# Patient Record
Sex: FEMALE | Race: WHITE | HISPANIC OR LATINO | Employment: OTHER | URBAN - METROPOLITAN AREA
[De-identification: names, ages, dates, MRNs, and addresses within clinical notes are randomized per-mention and may not be internally consistent; named-entity substitution may affect disease eponyms.]

---

## 2019-03-19 DIAGNOSIS — I10 ESSENTIAL HYPERTENSION: ICD-10-CM

## 2019-03-19 DIAGNOSIS — E78.2 MIXED HYPERLIPIDEMIA: ICD-10-CM

## 2019-03-19 LAB
ALBUMIN SERPL-MCNC: 3.9 G/DL (ref 3.5–4.6)
ALP BLD-CCNC: 105 U/L (ref 40–130)
ALT SERPL-CCNC: 27 U/L (ref 0–33)
ANION GAP SERPL CALCULATED.3IONS-SCNC: 16 MEQ/L (ref 9–15)
AST SERPL-CCNC: 27 U/L (ref 0–35)
BASOPHILS ABSOLUTE: 0 K/UL (ref 0–0.2)
BASOPHILS RELATIVE PERCENT: 0.7 %
BILIRUB SERPL-MCNC: 0.4 MG/DL (ref 0.2–0.7)
BUN BLDV-MCNC: 14 MG/DL (ref 8–23)
CALCIUM SERPL-MCNC: 9.4 MG/DL (ref 8.5–9.9)
CHLORIDE BLD-SCNC: 102 MEQ/L (ref 95–107)
CHOLESTEROL, TOTAL: 177 MG/DL (ref 0–199)
CO2: 27 MEQ/L (ref 20–31)
CREAT SERPL-MCNC: 0.69 MG/DL (ref 0.5–0.9)
EOSINOPHILS ABSOLUTE: 0.1 K/UL (ref 0–0.7)
EOSINOPHILS RELATIVE PERCENT: 1.2 %
GFR AFRICAN AMERICAN: >60
GFR NON-AFRICAN AMERICAN: >60
GLOBULIN: 3.4 G/DL (ref 2.3–3.5)
GLUCOSE BLD-MCNC: 99 MG/DL (ref 70–99)
HCT VFR BLD CALC: 42.3 % (ref 37–47)
HDLC SERPL-MCNC: 54 MG/DL (ref 40–59)
HEMOGLOBIN: 14.2 G/DL (ref 12–16)
LDL CHOLESTEROL CALCULATED: 98 MG/DL (ref 0–129)
LYMPHOCYTES ABSOLUTE: 1.6 K/UL (ref 1–4.8)
LYMPHOCYTES RELATIVE PERCENT: 26.2 %
MCH RBC QN AUTO: 30.5 PG (ref 27–31.3)
MCHC RBC AUTO-ENTMCNC: 33.5 % (ref 33–37)
MCV RBC AUTO: 91.1 FL (ref 82–100)
MONOCYTES ABSOLUTE: 0.6 K/UL (ref 0.2–0.8)
MONOCYTES RELATIVE PERCENT: 9.1 %
NEUTROPHILS ABSOLUTE: 3.9 K/UL (ref 1.4–6.5)
NEUTROPHILS RELATIVE PERCENT: 62.8 %
PDW BLD-RTO: 13.5 % (ref 11.5–14.5)
PLATELET # BLD: 228 K/UL (ref 130–400)
POTASSIUM SERPL-SCNC: 3.9 MEQ/L (ref 3.4–4.9)
RBC # BLD: 4.65 M/UL (ref 4.2–5.4)
SODIUM BLD-SCNC: 145 MEQ/L (ref 135–144)
TOTAL PROTEIN: 7.3 G/DL (ref 6.3–8)
TRIGL SERPL-MCNC: 126 MG/DL (ref 0–150)
WBC # BLD: 6.2 K/UL (ref 4.8–10.8)

## 2023-02-15 PROBLEM — F33.1 MODERATE EPISODE OF RECURRENT MAJOR DEPRESSIVE DISORDER (MULTI): Status: ACTIVE | Noted: 2023-02-15

## 2023-02-15 PROBLEM — N32.9 BLADDER DISORDER: Status: ACTIVE | Noted: 2023-02-15

## 2023-02-15 PROBLEM — E87.6 HYPOKALEMIA: Status: ACTIVE | Noted: 2023-02-15

## 2023-02-15 PROBLEM — B00.1 COLD SORE: Status: ACTIVE | Noted: 2023-02-15

## 2023-02-15 PROBLEM — M79.671 BILATERAL PAIN OF LEG AND FOOT: Status: ACTIVE | Noted: 2023-02-15

## 2023-02-15 PROBLEM — I10 ESSENTIAL HYPERTENSION: Status: ACTIVE | Noted: 2023-02-15

## 2023-02-15 PROBLEM — J01.00 ACUTE NON-RECURRENT MAXILLARY SINUSITIS: Status: RESOLVED | Noted: 2023-02-15 | Resolved: 2023-02-15

## 2023-02-15 PROBLEM — K21.9 GASTRO-ESOPHAGEAL REFLUX DISEASE WITHOUT ESOPHAGITIS: Status: ACTIVE | Noted: 2023-02-15

## 2023-02-15 PROBLEM — E78.2 MIXED HYPERLIPIDEMIA: Status: ACTIVE | Noted: 2023-02-15

## 2023-02-15 PROBLEM — M79.604 BILATERAL PAIN OF LEG AND FOOT: Status: ACTIVE | Noted: 2023-02-15

## 2023-02-15 PROBLEM — F32.A ANXIETY AND DEPRESSION: Status: ACTIVE | Noted: 2023-02-15

## 2023-02-15 PROBLEM — T14.8XXA MUSCLE STRAIN: Status: ACTIVE | Noted: 2023-02-15

## 2023-02-15 PROBLEM — J31.2 CHRONIC PHARYNGITIS: Status: ACTIVE | Noted: 2023-02-15

## 2023-02-15 PROBLEM — R10.30 INGUINAL PAIN: Status: ACTIVE | Noted: 2023-02-15

## 2023-02-15 PROBLEM — M25.552 HIP PAIN, LEFT: Status: ACTIVE | Noted: 2023-02-15

## 2023-02-15 PROBLEM — J32.9 SINUSITIS: Status: RESOLVED | Noted: 2023-02-15 | Resolved: 2023-02-15

## 2023-02-15 PROBLEM — F41.1 GENERALIZED ANXIETY DISORDER: Status: ACTIVE | Noted: 2023-02-15

## 2023-02-15 PROBLEM — R53.82 CHRONIC FATIGUE: Status: ACTIVE | Noted: 2023-02-15

## 2023-02-15 PROBLEM — H69.91 DYSFUNCTION OF RIGHT EUSTACHIAN TUBE: Status: ACTIVE | Noted: 2023-02-15

## 2023-02-15 PROBLEM — R06.09 DYSPNEA ON EXERTION: Status: ACTIVE | Noted: 2023-02-15

## 2023-02-15 PROBLEM — E55.9 VITAMIN D DEFICIENCY: Status: ACTIVE | Noted: 2023-02-15

## 2023-02-15 PROBLEM — M79.605 BILATERAL PAIN OF LEG AND FOOT: Status: ACTIVE | Noted: 2023-02-15

## 2023-02-15 PROBLEM — R60.0 BILATERAL LEG EDEMA: Status: ACTIVE | Noted: 2023-02-15

## 2023-02-15 PROBLEM — R19.04 LEFT LOWER QUADRANT ABDOMINAL MASS: Status: ACTIVE | Noted: 2023-02-15

## 2023-02-15 PROBLEM — H92.03 OTALGIA OF BOTH EARS: Status: RESOLVED | Noted: 2023-02-15 | Resolved: 2023-02-15

## 2023-02-15 PROBLEM — R10.30 LOWER ABDOMINAL PAIN: Status: ACTIVE | Noted: 2023-02-15

## 2023-02-15 PROBLEM — F41.9 ANXIETY AND DEPRESSION: Status: ACTIVE | Noted: 2023-02-15

## 2023-02-15 PROBLEM — L85.3 XEROSIS CUTIS: Status: ACTIVE | Noted: 2023-02-15

## 2023-02-15 PROBLEM — M79.672 BILATERAL PAIN OF LEG AND FOOT: Status: ACTIVE | Noted: 2023-02-15

## 2023-02-15 RX ORDER — METOPROLOL SUCCINATE 50 MG/1
1 TABLET, EXTENDED RELEASE ORAL DAILY
COMMUNITY
Start: 2020-09-01 | End: 2023-04-03

## 2023-02-15 RX ORDER — PAROXETINE HYDROCHLORIDE 40 MG/1
1 TABLET, FILM COATED ORAL DAILY
COMMUNITY

## 2023-02-15 RX ORDER — FUROSEMIDE 20 MG/1
20 TABLET ORAL DAILY
COMMUNITY
Start: 2020-08-09

## 2023-02-15 RX ORDER — NITROGLYCERIN 0.4 MG/1
0.4 TABLET SUBLINGUAL EVERY 5 MIN PRN
COMMUNITY
Start: 2018-09-30

## 2023-02-15 RX ORDER — POTASSIUM CHLORIDE 750 MG/1
1 TABLET, FILM COATED, EXTENDED RELEASE ORAL 2 TIMES DAILY
COMMUNITY
Start: 2019-11-21 | End: 2023-08-21

## 2023-02-15 RX ORDER — VALACYCLOVIR HYDROCHLORIDE 1 G/1
TABLET, FILM COATED ORAL
COMMUNITY
Start: 2018-11-20 | End: 2023-06-21

## 2023-02-15 RX ORDER — LISINOPRIL AND HYDROCHLOROTHIAZIDE 20; 25 MG/1; MG/1
1 TABLET ORAL DAILY
COMMUNITY
Start: 2019-11-21 | End: 2023-04-03

## 2023-02-15 RX ORDER — SIMVASTATIN 10 MG/1
1 TABLET, FILM COATED ORAL DAILY
COMMUNITY
Start: 2019-11-21 | End: 2023-04-03

## 2023-03-03 LAB
C. DIFFICILE TOXIN, PCR: NORMAL
CLOSTRIDIUM DIFFICILE NAP 1 STRAIN (PRESUMPTIVE): NORMAL

## 2023-03-04 LAB
CAMPYLOBACTER GP: NOT DETECTED
NOROVIRUS GI/GII: NOT DETECTED
ROTAVIRUS A: NOT DETECTED
SALMONELLA SP.: NOT DETECTED
SHIGA TOXIN 1: NOT DETECTED
SHIGA TOXIN 2: NOT DETECTED
SHIGELLA SP.: NOT DETECTED
VIBRIO GRP.: NOT DETECTED
YERSINIA ENTEROCOLITICA: NOT DETECTED

## 2023-03-06 ENCOUNTER — TELEPHONE (OUTPATIENT)
Dept: PRIMARY CARE | Facility: CLINIC | Age: 68
End: 2023-03-06
Payer: MEDICARE

## 2023-03-06 NOTE — TELEPHONE ENCOUNTER
"Result Communication    Resulted Orders   Stool Pathogen Panel, PCR   Result Value Ref Range    Yersinia Enterocolitica NOT DETECTED NOT DETECTED    Campylobacter gp. NOT DETECTED NOT DETECTED    Salmonella sp. NOT DETECTED NOT DETECTED    Shigella sp. NOT DETECTED NOT DETECTED    Vibrio grp. NOT DETECTED NOT DETECTED    Shiga Toxin 1 NOT DETECTED NOT DETECTED    Shiga Toxin 2 NOT DETECTED NOT DETECTED    Norovirus GI/GII NOT DETECTED NOT DETECTED    Rotavirus A NOT DETECTED NOT DETECTED      Comment:       The enteric PCR panel is a panel of sensitive and specific   amplified nucleic acid tests indicated as an aid in the   diagnosis of specific bacterial and viral agents of   gastrointestinal illness, in conjunction with other   clinical, laboratory, and epidemiological information.   This test is not approved for monitoring these infections.   Monitoring is available for Salmonella and Shigella   infections-request test \"Stool PCR Follow-Up ([facility])\".   Monitoring tests are not available at this time for other   enteric agents in this panel.         8:51 AM      Results were successfully communicated with the patient and they acknowledged their understanding.    "

## 2023-03-06 NOTE — TELEPHONE ENCOUNTER
----- Message from Michael Gonzalez MD sent at 3/5/2023  9:58 AM EST -----  Please call Ragini Hernandez that @[facility] stool culture result is negative.

## 2023-03-08 LAB — CALPROTECTIN, STOOL: 48 UG/G

## 2023-03-10 LAB
CRYPTOSPORIDIUM ANTIGEN-DATA CONVERSION: NEGATIVE
GIARDIA LAMBLIA AG-DATA CONVERSION: NEGATIVE
OVA + PARASITE EXAM: NEGATIVE

## 2023-04-01 DIAGNOSIS — I10 ESSENTIAL HYPERTENSION: ICD-10-CM

## 2023-04-01 DIAGNOSIS — E78.2 MIXED HYPERLIPIDEMIA: ICD-10-CM

## 2023-04-03 RX ORDER — METOPROLOL SUCCINATE 50 MG/1
TABLET, EXTENDED RELEASE ORAL
Qty: 30 TABLET | Refills: 5 | Status: SHIPPED | OUTPATIENT
Start: 2023-04-03 | End: 2023-10-18 | Stop reason: DRUGHIGH

## 2023-04-03 RX ORDER — LISINOPRIL AND HYDROCHLOROTHIAZIDE 20; 25 MG/1; MG/1
TABLET ORAL
Qty: 30 TABLET | Refills: 5 | Status: SHIPPED | OUTPATIENT
Start: 2023-04-03 | End: 2023-10-18 | Stop reason: DRUGHIGH

## 2023-04-03 RX ORDER — SIMVASTATIN 10 MG/1
TABLET, FILM COATED ORAL
Qty: 30 TABLET | Refills: 5 | Status: SHIPPED | OUTPATIENT
Start: 2023-04-03 | End: 2023-04-06 | Stop reason: DRUGHIGH

## 2023-04-05 ENCOUNTER — LAB (OUTPATIENT)
Dept: LAB | Facility: LAB | Age: 68
End: 2023-04-05
Payer: MEDICARE

## 2023-04-05 ENCOUNTER — OFFICE VISIT (OUTPATIENT)
Dept: PRIMARY CARE | Facility: CLINIC | Age: 68
End: 2023-04-05
Payer: MEDICARE

## 2023-04-05 VITALS
WEIGHT: 187.6 LBS | HEIGHT: 60 IN | BODY MASS INDEX: 36.83 KG/M2 | DIASTOLIC BLOOD PRESSURE: 80 MMHG | OXYGEN SATURATION: 98 % | SYSTOLIC BLOOD PRESSURE: 112 MMHG | HEART RATE: 67 BPM

## 2023-04-05 DIAGNOSIS — E66.01 CLASS 2 SEVERE OBESITY DUE TO EXCESS CALORIES WITH SERIOUS COMORBIDITY AND BODY MASS INDEX (BMI) OF 36.0 TO 36.9 IN ADULT (MULTI): ICD-10-CM

## 2023-04-05 DIAGNOSIS — R19.7 DIARRHEA, UNSPECIFIED TYPE: ICD-10-CM

## 2023-04-05 DIAGNOSIS — Z00.00 ROUTINE GENERAL MEDICAL EXAMINATION AT HEALTH CARE FACILITY: Primary | ICD-10-CM

## 2023-04-05 DIAGNOSIS — F33.1 MODERATE EPISODE OF RECURRENT MAJOR DEPRESSIVE DISORDER (MULTI): ICD-10-CM

## 2023-04-05 DIAGNOSIS — R10.13 EPIGASTRIC PAIN: ICD-10-CM

## 2023-04-05 DIAGNOSIS — E78.2 MIXED HYPERLIPIDEMIA: ICD-10-CM

## 2023-04-05 DIAGNOSIS — I10 ESSENTIAL HYPERTENSION: ICD-10-CM

## 2023-04-05 DIAGNOSIS — K57.90 DIVERTICULOSIS: ICD-10-CM

## 2023-04-05 LAB
ALANINE AMINOTRANSFERASE (SGPT) (U/L) IN SER/PLAS: 18 U/L (ref 7–45)
ALBUMIN (G/DL) IN SER/PLAS: 4.3 G/DL (ref 3.4–5)
ALKALINE PHOSPHATASE (U/L) IN SER/PLAS: 95 U/L (ref 33–136)
ANION GAP IN SER/PLAS: 12 MMOL/L (ref 10–20)
ASPARTATE AMINOTRANSFERASE (SGOT) (U/L) IN SER/PLAS: 20 U/L (ref 9–39)
BASOPHILS (10*3/UL) IN BLOOD BY AUTOMATED COUNT: 0.03 X10E9/L (ref 0–0.1)
BASOPHILS/100 LEUKOCYTES IN BLOOD BY AUTOMATED COUNT: 0.4 % (ref 0–2)
BILIRUBIN TOTAL (MG/DL) IN SER/PLAS: 1 MG/DL (ref 0–1.2)
CALCIUM (MG/DL) IN SER/PLAS: 9.7 MG/DL (ref 8.6–10.3)
CARBON DIOXIDE, TOTAL (MMOL/L) IN SER/PLAS: 34 MMOL/L (ref 21–32)
CHLORIDE (MMOL/L) IN SER/PLAS: 102 MMOL/L (ref 98–107)
CHOLESTEROL (MG/DL) IN SER/PLAS: 214 MG/DL (ref 0–199)
CHOLESTEROL IN HDL (MG/DL) IN SER/PLAS: 72.9 MG/DL
CHOLESTEROL/HDL RATIO: 2.9
CREATININE (MG/DL) IN SER/PLAS: 1.01 MG/DL (ref 0.5–1.05)
EOSINOPHILS (10*3/UL) IN BLOOD BY AUTOMATED COUNT: 0.06 X10E9/L (ref 0–0.7)
EOSINOPHILS/100 LEUKOCYTES IN BLOOD BY AUTOMATED COUNT: 0.8 % (ref 0–6)
ERYTHROCYTE DISTRIBUTION WIDTH (RATIO) BY AUTOMATED COUNT: 12.8 % (ref 11.5–14.5)
ERYTHROCYTE MEAN CORPUSCULAR HEMOGLOBIN CONCENTRATION (G/DL) BY AUTOMATED: 32.8 G/DL (ref 32–36)
ERYTHROCYTE MEAN CORPUSCULAR VOLUME (FL) BY AUTOMATED COUNT: 94 FL (ref 80–100)
ERYTHROCYTES (10*6/UL) IN BLOOD BY AUTOMATED COUNT: 5 X10E12/L (ref 4–5.2)
GFR FEMALE: 60 ML/MIN/1.73M2
GLUCOSE (MG/DL) IN SER/PLAS: 116 MG/DL (ref 74–99)
HEMATOCRIT (%) IN BLOOD BY AUTOMATED COUNT: 46.9 % (ref 36–46)
HEMOGLOBIN (G/DL) IN BLOOD: 15.4 G/DL (ref 12–16)
IMMATURE GRANULOCYTES/100 LEUKOCYTES IN BLOOD BY AUTOMATED COUNT: 0.3 % (ref 0–0.9)
LDL: 117 MG/DL (ref 0–99)
LEUKOCYTES (10*3/UL) IN BLOOD BY AUTOMATED COUNT: 7.3 X10E9/L (ref 4.4–11.3)
LYMPHOCYTES (10*3/UL) IN BLOOD BY AUTOMATED COUNT: 1.54 X10E9/L (ref 1.2–4.8)
LYMPHOCYTES/100 LEUKOCYTES IN BLOOD BY AUTOMATED COUNT: 21.2 % (ref 13–44)
MONOCYTES (10*3/UL) IN BLOOD BY AUTOMATED COUNT: 0.65 X10E9/L (ref 0.1–1)
MONOCYTES/100 LEUKOCYTES IN BLOOD BY AUTOMATED COUNT: 9 % (ref 2–10)
NEUTROPHILS (10*3/UL) IN BLOOD BY AUTOMATED COUNT: 4.96 X10E9/L (ref 1.2–7.7)
NEUTROPHILS/100 LEUKOCYTES IN BLOOD BY AUTOMATED COUNT: 68.3 % (ref 40–80)
PLATELETS (10*3/UL) IN BLOOD AUTOMATED COUNT: 268 X10E9/L (ref 150–450)
POTASSIUM (MMOL/L) IN SER/PLAS: 4.2 MMOL/L (ref 3.5–5.3)
PROTEIN TOTAL: 7 G/DL (ref 6.4–8.2)
SODIUM (MMOL/L) IN SER/PLAS: 144 MMOL/L (ref 136–145)
TRIGLYCERIDE (MG/DL) IN SER/PLAS: 121 MG/DL (ref 0–149)
UREA NITROGEN (MG/DL) IN SER/PLAS: 19 MG/DL (ref 6–23)
VLDL: 24 MG/DL (ref 0–40)

## 2023-04-05 PROCEDURE — 3074F SYST BP LT 130 MM HG: CPT | Performed by: FAMILY MEDICINE

## 2023-04-05 PROCEDURE — 1036F TOBACCO NON-USER: CPT | Performed by: FAMILY MEDICINE

## 2023-04-05 PROCEDURE — 85025 COMPLETE CBC W/AUTO DIFF WBC: CPT

## 2023-04-05 PROCEDURE — 80061 LIPID PANEL: CPT

## 2023-04-05 PROCEDURE — G0439 PPPS, SUBSEQ VISIT: HCPCS | Performed by: FAMILY MEDICINE

## 2023-04-05 PROCEDURE — 1170F FXNL STATUS ASSESSED: CPT | Performed by: FAMILY MEDICINE

## 2023-04-05 PROCEDURE — 36415 COLL VENOUS BLD VENIPUNCTURE: CPT

## 2023-04-05 PROCEDURE — 1159F MED LIST DOCD IN RCRD: CPT | Performed by: FAMILY MEDICINE

## 2023-04-05 PROCEDURE — 1160F RVW MEDS BY RX/DR IN RCRD: CPT | Performed by: FAMILY MEDICINE

## 2023-04-05 PROCEDURE — 99214 OFFICE O/P EST MOD 30 MIN: CPT | Performed by: FAMILY MEDICINE

## 2023-04-05 PROCEDURE — 3079F DIAST BP 80-89 MM HG: CPT | Performed by: FAMILY MEDICINE

## 2023-04-05 PROCEDURE — 80053 COMPREHEN METABOLIC PANEL: CPT

## 2023-04-05 PROCEDURE — 3008F BODY MASS INDEX DOCD: CPT | Performed by: FAMILY MEDICINE

## 2023-04-05 RX ORDER — BUPROPION HYDROCHLORIDE 300 MG/1
300 TABLET ORAL DAILY
COMMUNITY

## 2023-04-05 RX ORDER — PANTOPRAZOLE SODIUM 40 MG/1
40 TABLET, DELAYED RELEASE ORAL DAILY
Qty: 30 TABLET | Refills: 1 | Status: SHIPPED | OUTPATIENT
Start: 2023-04-05 | End: 2023-06-21

## 2023-04-05 RX ORDER — CELECOXIB 200 MG/1
200 CAPSULE ORAL DAILY
COMMUNITY
End: 2023-08-02

## 2023-04-05 ASSESSMENT — ENCOUNTER SYMPTOMS
DEPRESSION: 0
OCCASIONAL FEELINGS OF UNSTEADINESS: 1
LOSS OF SENSATION IN FEET: 0

## 2023-04-05 ASSESSMENT — ACTIVITIES OF DAILY LIVING (ADL)
DRESSING: INDEPENDENT
DOING_HOUSEWORK: INDEPENDENT
GROCERY_SHOPPING: INDEPENDENT
BATHING: INDEPENDENT
TAKING_MEDICATION: INDEPENDENT
MANAGING_FINANCES: INDEPENDENT

## 2023-04-05 ASSESSMENT — PATIENT HEALTH QUESTIONNAIRE - PHQ9
SUM OF ALL RESPONSES TO PHQ9 QUESTIONS 1 AND 2: 0
1. LITTLE INTEREST OR PLEASURE IN DOING THINGS: NOT AT ALL
SUM OF ALL RESPONSES TO PHQ9 QUESTIONS 1 AND 2: 0
2. FEELING DOWN, DEPRESSED OR HOPELESS: NOT AT ALL
2. FEELING DOWN, DEPRESSED OR HOPELESS: NOT AT ALL
1. LITTLE INTEREST OR PLEASURE IN DOING THINGS: NOT AT ALL

## 2023-04-05 NOTE — ASSESSMENT & PLAN NOTE
The nature of cardiac risk has been fully discussed with this patient. Discussed cardiovascular risk analysis and appropriate diet with the need for lifelong measures to reduce the risk. A regular exercise program is recommended to help achieve and maintain normal body weight, fitness and improve lipid balance. Patient education provided. They understand and agree with this course of treatment. They will return with new or worsening symptoms. Patient instructed to remain current with appropriate annual health maintenance.

## 2023-04-05 NOTE — PROGRESS NOTES
Chief Complaint   Patient presents with    Follow-up     Hypertension, Hyperlipidemia and Back pain    Medicare Annual Wellness Visit Subsequent       Ragini Hernandez is a 67 y.o. female here for Annual Medicare Wellness Visit and follow up on hypertension, hyperlipidemia, back pain and other chronic medical conditions.    Patient is here for follow-up on hypertension and hyperlipidemia. She is not exercising and is adherent to a low-salt diet. Patient denies chest pain, dyspnea, exertional chest pressure/discomfort, near-syncope, orthopnea, palpitations, paroxysmal nocturnal dyspnea, and syncope. Taking his medication regularly with no side effects.    Back Pain  This is a chronic problem. The problem occurs intermittently. The problem has been gradually improving since onset. The pain is present in the lumbar spine and sacro-iliac. The pain is at a severity of 4/10. The pain is mild. The pain is The same all the time. The symptoms are aggravated by standing and twisting. Stiffness is present In the morning. Associated symptoms include abdominal pain and headaches. Pertinent negatives include no bladder incontinence, bowel incontinence, chest pain, fever, leg pain, numbness, paresis, paresthesias, pelvic pain, perianal numbness, tingling, weakness or weight loss. Treatments tried: celebrex. The treatment provided mild relief.   Abdominal Pain  This is a new problem. The current episode started in the past 7 days. The onset quality is sudden. The problem occurs constantly. The problem has been unchanged. The pain is located in the epigastric region. The pain is at a severity of 4/10. The pain is mild. The quality of the pain is aching. Associated symptoms include diarrhea, headaches, melena and nausea. Pertinent negatives include no anorexia, arthralgias, belching, constipation, fever, flatus, frequency, hematochezia, hematuria, myalgias, vomiting or weight loss. The pain is aggravated by belching. The pain is  relieved by Nothing. She has tried antacids for the symptoms. The treatment provided no relief. There is no history of abdominal surgery, colon cancer, Crohn's disease, gallstones, GERD, irritable bowel syndrome, pancreatitis, PUD or ulcerative colitis.     She of sore throat no epistaxis, headaches, hearing change, nasal congestion, oral lesions, sinus pain, tinnitus, or vertigo    Past Medical, Surgical, and Family History reviewed and updated in chart.    Reviewed all medications by prescribing practitioner or clinical pharmacist (such as prescriptions, OTCs, herbal therapies and supplements) and documented in the medical record.    Patient Self Assessment of Health Status  Patient Self Assessment: Good    Nutrition and Exercise  Current Diet: Well Balanced Diet  Adequate Fluid Intake: Yes  Caffeine: Yes  Exercise Frequency: Infrequently    Functional Ability/Level of Safety  Cognitive Impairment Observed: No cognitive impairment observed  Cognitive Impairment Reported: No cognitive impairment reported by patient or family    Home Safety Risk Factors: None    Review of Systems - General ROS: negative for - fatigue, fever, malaise, night sweats, sleep disturbance or weight loss  Psychological ROS: negative for - concentration difficulties, memory difficulties or sleep disturbances  ENT ROS: negative for - hearing change, nasal discharge, oral lesions, sinus pain, sore throat, tinnitus or vertigo  Allergy and Immunology ROS: negative for - hives, nasal congestion or seasonal allergies  Hematological and Lymphatic ROS: negative for - bruising, fatigue, night sweats or pallor  Endocrine ROS: negative for - hot flashes, malaise/lethargy, palpitations, polydipsia/polyuria, skin changes, temperature intolerance or unexpected weight changes  Respiratory ROS: negative for - cough, hemoptysis, pleuritic pain, shortness of breath or wheezing  Cardiovascular ROS: no chest pain or dyspnea on exertion  Gastrointestinal ROS: as  noted in HPI  Genito-Urinary ROS: no dysuria, trouble voiding, or hematuria  Musculoskeletal ROS: negative for - joint pain, joint stiffness, joint swelling, muscle pain or muscular weakness  Neurological ROS: negative for - dizziness, gait disturbance, headaches, impaired coordination/balance, numbness/tingling, tremors or visual changes  Dermatological ROS: negative for - dry skin, lumps, pruritus or rash    Objective   Vitals:  /80   Pulse 67   Ht 1.524 m (5')   Wt 85.1 kg (187 lb 9.6 oz)   SpO2 98%   BMI 36.64 kg/m²       Physical Examination: General appearance - alert, well appearing, and in no distress  Mental status - alert, oriented to person, place, and time  Eyes - pupils equal and reactive, extraocular eye movements intact  Ears - bilateral TM's and external ear canals normal  Mouth - mucous membranes moist, pharynx normal without lesions  Neck - supple, no significant adenopathy  Lymphatics - no palpable lymphadenopathy, no hepatosplenomegaly  Chest - clear to auscultation, no wheezes, rales or rhonchi, symmetric air entry  Heart - normal rate, regular rhythm, normal S1, S2, no murmurs, rubs, clicks or gallops  Abdomen - soft, nontender, nondistended, no masses or organomegaly  Neurological - alert, oriented, normal speech, no focal findings or movement disorder noted  Extremities: peripheral pulses normal, no pedal edema, no clubbing or cyanosis.    Assessment/Plan     Problem List Items Addressed This Visit          Nervous    Epigastric pain    Relevant Medications    pantoprazole (ProtoNix) 40 mg EC tablet    Other Relevant Orders    Follow Up In [facility] - PCP       Circulatory    Essential hypertension    Current Assessment & Plan     Dietary sodium restriction.  Regular aerobic exercise program is recommended to help achieve and maintain normal body weight, fitness and improve lipid balance. .  Dietary changes: Increase soluble fiber  Plant sterols 2grams per day (e.g.  "Benecol)  Reduce saturated fat, \"trans\" monounsaturated fatty acids, and cholesterol           Relevant Orders    CBC and Auto Differential    Comprehensive Metabolic Panel    Lipid Panel       Digestive    Diverticulosis       Endocrine/Metabolic    Class 2 severe obesity due to excess calories with serious comorbidity and body mass index (BMI) of 36.0 to 36.9 in adult (CMS/[facility])    Current Assessment & Plan     Continue decrease calorie diet and not more than 1500 calorie per day diet and low-fat diet.  Continue with regular exercise program.  We advised exercise at least 5 days a week for at least 45 minutes and also a minimum of 10,000 steps a day.  The detrimental effects of obesity on health were discussed.              Other    Mixed hyperlipidemia    Current Assessment & Plan     The nature of cardiac risk has been fully discussed with this patient. Discussed cardiovascular risk analysis and appropriate diet with the need for lifelong measures to reduce the risk. A regular exercise program is recommended to help achieve and maintain normal body weight, fitness and improve lipid balance. Patient education provided. They understand and agree with this course of treatment. They will return with new or worsening symptoms. Patient instructed to remain current with appropriate annual health maintenance.            Relevant Orders    CBC and Auto Differential    Comprehensive Metabolic Panel    Lipid Panel    Moderate episode of recurrent major depressive disorder (CMS/[facility])    Current Assessment & Plan     Chronic Condition Documentation : Stable based on symptoms and exam.  Continue established treatment plan and follow-up at least yearly.           Routine general medical examination at health care facility - Primary    Diarrhea, unspecified    Current Assessment & Plan     Start OTC Metamucil to increase fiber and fluids while taking. Will consider GI referral at upcoming follow up.         Relevant Orders    " Follow Up In [facility] - PCP     Scribe Attestation  By signing my name below, I, Lang Lujan   attest that this documentation has been prepared under the direction and in the presence of Michael Gonzalez MD.

## 2023-04-05 NOTE — PATIENT INSTRUCTIONS
BMI was above normal measurement. Current weight: 85.1 kg (187 lb 9.6 oz)  Weight change since last visit (-) denotes wt loss -5.4 lbs   Weight loss needed to achieve BMI 25: 59.9 Lbs  Weight loss needed to achieve BMI 30: 34.3 Lbs  Provided instructions on dietary changes  Provided instructions on exercise  Advised to Increase physical activity.

## 2023-04-05 NOTE — ASSESSMENT & PLAN NOTE
Start OTC Metamucil to increase fiber and fluids while taking. Will consider GI referral at upcoming follow up.

## 2023-04-06 DIAGNOSIS — E78.2 MIXED HYPERLIPIDEMIA: Primary | ICD-10-CM

## 2023-04-06 RX ORDER — SIMVASTATIN 20 MG/1
20 TABLET, FILM COATED ORAL NIGHTLY
Qty: 30 TABLET | Refills: 5 | Status: SHIPPED | OUTPATIENT
Start: 2023-04-06 | End: 2023-12-02

## 2023-05-18 ENCOUNTER — OFFICE VISIT (OUTPATIENT)
Dept: PRIMARY CARE | Facility: CLINIC | Age: 68
End: 2023-05-18
Payer: MEDICARE

## 2023-05-18 VITALS
SYSTOLIC BLOOD PRESSURE: 128 MMHG | HEART RATE: 66 BPM | HEIGHT: 60 IN | RESPIRATION RATE: 18 BRPM | TEMPERATURE: 97.4 F | BODY MASS INDEX: 37.26 KG/M2 | DIASTOLIC BLOOD PRESSURE: 64 MMHG | OXYGEN SATURATION: 98 % | WEIGHT: 189.8 LBS

## 2023-05-18 DIAGNOSIS — E78.2 MIXED HYPERLIPIDEMIA: ICD-10-CM

## 2023-05-18 DIAGNOSIS — E66.01 CLASS 2 SEVERE OBESITY DUE TO EXCESS CALORIES WITH SERIOUS COMORBIDITY AND BODY MASS INDEX (BMI) OF 37.0 TO 37.9 IN ADULT (MULTI): ICD-10-CM

## 2023-05-18 DIAGNOSIS — K21.9 GASTRO-ESOPHAGEAL REFLUX DISEASE WITHOUT ESOPHAGITIS: ICD-10-CM

## 2023-05-18 DIAGNOSIS — I80.232: ICD-10-CM

## 2023-05-18 DIAGNOSIS — I10 ESSENTIAL HYPERTENSION: ICD-10-CM

## 2023-05-18 DIAGNOSIS — M17.0 PRIMARY OSTEOARTHRITIS OF BOTH KNEES: ICD-10-CM

## 2023-05-18 DIAGNOSIS — R19.7 DIARRHEA, UNSPECIFIED TYPE: ICD-10-CM

## 2023-05-18 DIAGNOSIS — R10.13 EPIGASTRIC ABDOMINAL PAIN: Primary | ICD-10-CM

## 2023-05-18 DIAGNOSIS — F33.1 MODERATE EPISODE OF RECURRENT MAJOR DEPRESSIVE DISORDER (MULTI): ICD-10-CM

## 2023-05-18 PROCEDURE — 3078F DIAST BP <80 MM HG: CPT | Performed by: FAMILY MEDICINE

## 2023-05-18 PROCEDURE — 1159F MED LIST DOCD IN RCRD: CPT | Performed by: FAMILY MEDICINE

## 2023-05-18 PROCEDURE — 99213 OFFICE O/P EST LOW 20 MIN: CPT | Performed by: FAMILY MEDICINE

## 2023-05-18 PROCEDURE — 1160F RVW MEDS BY RX/DR IN RCRD: CPT | Performed by: FAMILY MEDICINE

## 2023-05-18 PROCEDURE — 1036F TOBACCO NON-USER: CPT | Performed by: FAMILY MEDICINE

## 2023-05-18 PROCEDURE — 3008F BODY MASS INDEX DOCD: CPT | Performed by: FAMILY MEDICINE

## 2023-05-18 PROCEDURE — 3074F SYST BP LT 130 MM HG: CPT | Performed by: FAMILY MEDICINE

## 2023-05-18 ASSESSMENT — PATIENT HEALTH QUESTIONNAIRE - PHQ9
4. FEELING TIRED OR HAVING LITTLE ENERGY: NEARLY EVERY DAY
2. FEELING DOWN, DEPRESSED OR HOPELESS: MORE THAN HALF THE DAYS
5. POOR APPETITE OR OVEREATING: NEARLY EVERY DAY
10. IF YOU CHECKED OFF ANY PROBLEMS, HOW DIFFICULT HAVE THESE PROBLEMS MADE IT FOR YOU TO DO YOUR WORK, TAKE CARE OF THINGS AT HOME, OR GET ALONG WITH OTHER PEOPLE: VERY DIFFICULT
9. THOUGHTS THAT YOU WOULD BE BETTER OFF DEAD, OR OF HURTING YOURSELF: NOT AT ALL
SUM OF ALL RESPONSES TO PHQ QUESTIONS 1-9: 13
6. FEELING BAD ABOUT YOURSELF - OR THAT YOU ARE A FAILURE OR HAVE LET YOURSELF OR YOUR FAMILY DOWN: NOT AT ALL
8. MOVING OR SPEAKING SO SLOWLY THAT OTHER PEOPLE COULD HAVE NOTICED. OR THE OPPOSITE, BEING SO FIGETY OR RESTLESS THAT YOU HAVE BEEN MOVING AROUND A LOT MORE THAN USUAL: NOT AT ALL
7. TROUBLE CONCENTRATING ON THINGS, SUCH AS READING THE NEWSPAPER OR WATCHING TELEVISION: NOT AT ALL
SUM OF ALL RESPONSES TO PHQ9 QUESTIONS 1 AND 2: 4
3. TROUBLE FALLING OR STAYING ASLEEP OR SLEEPING TOO MUCH: NEARLY EVERY DAY
1. LITTLE INTEREST OR PLEASURE IN DOING THINGS: MORE THAN HALF THE DAYS

## 2023-05-18 NOTE — PROGRESS NOTES
Chief Complaint   Patient presents with    Follow-up     Epigastric Abdominal pain and Diarrhea      Ragini Hernandez is a 68 y.o. female who presents today for Follow up on abdominal pain. The patient is experiencing epigastric pain without radiation. The pain is described as aching and burning. Onset was 6 weeks ago. Symptoms have been unchanged. Aggravating factors: none.  Alleviating factors: none. Associated symptoms: diarrhea which has been improving. The patient denies chills, fever, hematochezia, and melena.    Past Medical History:   Diagnosis Date    Acute non-recurrent maxillary sinusitis 02/15/2023    Otalgia of both ears 02/15/2023     Patient Active Problem List    Diagnosis Date Noted    Epigastric abdominal pain 05/18/2023    Thrombophlebitis of anterior tibial vein, left (CMS/[facility]) 05/18/2023    Routine general medical examination at health care facility 04/05/2023    Diarrhea, unspecified 04/05/2023    Epigastric pain 04/05/2023    Diverticulosis 04/05/2023    Class 2 severe obesity due to excess calories with serious comorbidity and body mass index (BMI) of 37.0 to 37.9 in adult (CMS/[facility]) 04/05/2023    Lower abdominal pain 02/15/2023    Anxiety and depression 02/15/2023    Generalized anxiety disorder 02/15/2023    Bilateral leg edema 02/15/2023    Bladder disorder 02/15/2023    Chronic fatigue 02/15/2023    Chronic pharyngitis 02/15/2023    Cold sore 02/15/2023    Dysfunction of right eustachian tube 02/15/2023    Dyspnea on exertion 02/15/2023    Essential hypertension 02/15/2023    Gastro-esophageal reflux disease without esophagitis 02/15/2023    Bilateral pain of leg and foot 02/15/2023    Hip pain, left 02/15/2023    Hypokalemia 02/15/2023    Inguinal pain 02/15/2023    Left lower quadrant abdominal mass 02/15/2023    Mixed hyperlipidemia 02/15/2023    Moderate episode of recurrent major depressive disorder (CMS/[facility]) 02/15/2023    Muscle strain 02/15/2023    Vitamin D deficiency 02/15/2023     Xerosis cutis 02/15/2023     Past Surgical History:   Procedure Laterality Date    OTHER SURGICAL HISTORY  09/03/2020    Tonsillectomy    OTHER SURGICAL HISTORY  09/03/2020    Knee arthroscopy    OTHER SURGICAL HISTORY  09/03/2020    Hysterectomy    OTHER SURGICAL HISTORY  04/02/2021    Colonoscopy     No family history on file.    Social History     Socioeconomic History    Marital status:      Spouse name: None    Number of children: None    Years of education: None    Highest education level: None   Occupational History    None   Tobacco Use    Smoking status: Never    Smokeless tobacco: Never   Vaping Use    Vaping status: None   Substance and Sexual Activity    Alcohol use: None    Drug use: None    Sexual activity: None   Other Topics Concern    None   Social History Narrative    None     Social Determinants of Health     Financial Resource Strain: Not on file   Food Insecurity: Not on file   Transportation Needs: Not on file   Physical Activity: Not on file   Stress: Not on file   Social Connections: Not on file   Intimate Partner Violence: Not on file   Housing Stability: Not on file     Current Outpatient Medications   Medication Sig Dispense Refill    buPROPion XL (Wellbutrin XL) 300 mg 24 hr tablet Take 1 tablet (300 mg) by mouth once daily. Do not crush, chew, or split.      celecoxib (CeleBREX) 200 mg capsule Take 1 capsule (200 mg) by mouth once daily.      furosemide (Lasix) 20 mg tablet Take 1 tablet (20 mg) by mouth once daily.      lisinopriL-hydrochlorothiazide 20-25 mg tablet TAKE ONE TABLET BY MOUTH ONCE DAILY 30 tablet 5    metoprolol succinate XL (Toprol-XL) 50 mg 24 hr tablet TAKE ONE TABLET BY MOUTH DAILY 30 tablet 5    nitroglycerin (Nitrostat) 0.4 mg SL tablet Place 1 tablet (0.4 mg) under the tongue every 5 minutes if needed.      pantoprazole (ProtoNix) 40 mg EC tablet Take 1 tablet (40 mg) by mouth once daily. Do not crush, chew, or split. 30 tablet 1    PARoxetine (Paxil) 40 mg  tablet Take 1 tablet (40 mg) by mouth once daily.      potassium chloride CR (Klor-Con) 10 mEq ER tablet Take 1 tablet (10 mEq) by mouth 2 times a day.      simvastatin (Zocor) 20 mg tablet Take 1 tablet (20 mg) by mouth once daily at bedtime. 30 tablet 5    valACYclovir (Valtrex) 1 gram tablet TAKE TWO TABLETS BY MOUTH EVERY 12 HOURS for 2 doses       No current facility-administered medications for this visit.     Current Outpatient Medications on File Prior to Visit   Medication Sig Dispense Refill    buPROPion XL (Wellbutrin XL) 300 mg 24 hr tablet Take 1 tablet (300 mg) by mouth once daily. Do not crush, chew, or split.      celecoxib (CeleBREX) 200 mg capsule Take 1 capsule (200 mg) by mouth once daily.      furosemide (Lasix) 20 mg tablet Take 1 tablet (20 mg) by mouth once daily.      lisinopriL-hydrochlorothiazide 20-25 mg tablet TAKE ONE TABLET BY MOUTH ONCE DAILY 30 tablet 5    metoprolol succinate XL (Toprol-XL) 50 mg 24 hr tablet TAKE ONE TABLET BY MOUTH DAILY 30 tablet 5    nitroglycerin (Nitrostat) 0.4 mg SL tablet Place 1 tablet (0.4 mg) under the tongue every 5 minutes if needed.      pantoprazole (ProtoNix) 40 mg EC tablet Take 1 tablet (40 mg) by mouth once daily. Do not crush, chew, or split. 30 tablet 1    PARoxetine (Paxil) 40 mg tablet Take 1 tablet (40 mg) by mouth once daily.      potassium chloride CR (Klor-Con) 10 mEq ER tablet Take 1 tablet (10 mEq) by mouth 2 times a day.      simvastatin (Zocor) 20 mg tablet Take 1 tablet (20 mg) by mouth once daily at bedtime. 30 tablet 5    valACYclovir (Valtrex) 1 gram tablet TAKE TWO TABLETS BY MOUTH EVERY 12 HOURS for 2 doses       No current facility-administered medications on file prior to visit.     No Known Allergies       Review of Systems - General ROS: negative for - fatigue, fever, malaise, night sweats or weight loss  ENT ROS: negative for - hearing change, nasal discharge, oral lesions, sinus pain, sore throat, tinnitus or  vertigo  Allergy and Immunology ROS: negative for - hives, nasal congestion or seasonal allergies  Respiratory ROS: negative for - cough, hemoptysis, pleuritic pain, shortness of breath or wheezing  Cardiovascular ROS: no chest pain or dyspnea on exertion, palpitations, PND or orthopnea.  No syncope.  Genito-Urinary ROS: no dysuria, trouble voiding, or hematuria  Dermatological ROS: negative for - dry skin, lumps, pruritus or rash  Musculoskeletal ROS: negative for - joint pain, joint stiffness, joint swelling, muscle pain or muscular weakness  Neurological ROS: negative for - dizziness, gait disturbance, headaches, impaired coordination/balance, numbness/tingling, tremors or visual changes  Psychological ROS: negative for - anxiety, concentration difficulties, depression, memory difficulties or sleep disturbances  Endocrine ROS: negative for - hot flashes, malaise/lethargy, palpitations, polydipsia/polyuria, skin changes, temperature intolerance   Hematological and Lymphatic ROS: negative for - bruising, night sweats or pallor    Blood pressure 128/64, pulse 66, temperature 36.3 °C (97.4 °F), resp. rate 18, height 1.524 m (5'), weight 86.1 kg (189 lb 12.8 oz), SpO2 98 %.    Physical Examination: General appearance - alert, well appearing, and in no distress  HEENT EOMI, PEERLA, normal eyelids.  Oropharynx no erythema or exudate.  Normal tonsils.    Ears -external auditory canal normal bilaterally.  Tympanic membranes normal bilaterally.  Mouth - mucous membranes moist, pharynx normal without lesions  Neck - supple, trachea central no thyromegaly.  No significant adenopathy  Chest -good air entry bilaterally, no rhonchi rales and no wheezes.  Heart -normal S1 and S2, regular rate and rhythm with no murmurs gallop or rub.  Abdomen -nondistended, soft, nontender with no guarding, no palpable mass and no CVA tenderness. Bowel sounds normal.  No hernia.  Extremities: peripheral pulses normal, no clubbing or  cyanosis.    Lab on 04/05/2023   Component Date Value Ref Range Status    WBC 04/05/2023 7.3  4.4 - 11.3 x10E9/L Final    RBC 04/05/2023 5.00  4.00 - 5.20 x10E12/L Final    Hemoglobin 04/05/2023 15.4  12.0 - 16.0 g/dL Final    Hematocrit 04/05/2023 46.9 (H)  36.0 - 46.0 % Final    MCV 04/05/2023 94  80 - 100 fL Final    MCHC 04/05/2023 32.8  32.0 - 36.0 g/dL Final    Platelets 04/05/2023 268  150 - 450 x10E9/L Final    RDW 04/05/2023 12.8  11.5 - 14.5 % Final    Neutrophils % 04/05/2023 68.3  40.0 - 80.0 % Final    Immature Granulocytes %, Automated 04/05/2023 0.3  0.0 - 0.9 % Final     Immature Granulocyte Count (IG) includes promyelocytes,    myelocytes and metamyelocytes but does not include bands.   Percent differential counts (%) should be interpreted in the   context of the absolute cell counts (cells/L).    Lymphocytes % 04/05/2023 21.2  13.0 - 44.0 % Final    Monocytes % 04/05/2023 9.0  2.0 - 10.0 % Final    Eosinophils % 04/05/2023 0.8  0.0 - 6.0 % Final    Basophils % 04/05/2023 0.4  0.0 - 2.0 % Final    Neutrophils Absolute 04/05/2023 4.96  1.20 - 7.70 x10E9/L Final    Lymphocytes Absolute 04/05/2023 1.54  1.20 - 4.80 x10E9/L Final    Monocytes Absolute 04/05/2023 0.65  0.10 - 1.00 x10E9/L Final    Eosinophils Absolute 04/05/2023 0.06  0.00 - 0.70 x10E9/L Final    Basophils Absolute 04/05/2023 0.03  0.00 - 0.10 x10E9/L Final    Glucose 04/05/2023 116 (H)  74 - 99 mg/dL Final    Sodium 04/05/2023 144  136 - 145 mmol/L Final    Potassium 04/05/2023 4.2  3.5 - 5.3 mmol/L Final    Chloride 04/05/2023 102  98 - 107 mmol/L Final    Bicarbonate 04/05/2023 34 (H)  21 - 32 mmol/L Final    Anion Gap 04/05/2023 12  10 - 20 mmol/L Final    Urea Nitrogen 04/05/2023 19  6 - 23 mg/dL Final    Creatinine 04/05/2023 1.01  0.50 - 1.05 mg/dL Final    GFR Female 04/05/2023 60  >90 mL/min/1.73m2 Final     CALCULATIONS OF ESTIMATED GFR ARE PERFORMED   USING THE 2021 CKD-EPI STUDY REFIT EQUATION   WITHOUT THE RACE VARIABLE FOR  THE IDMS-TRACEABLE   CREATININE METHODS.    https://jasn.asnjournals.org/content/early/2021/09/22/ASN.2330991717    Calcium 04/05/2023 9.7  8.6 - 10.3 mg/dL Final    Albumin 04/05/2023 4.3  3.4 - 5.0 g/dL Final    Alkaline Phosphatase 04/05/2023 95  33 - 136 U/L Final    Total Protein 04/05/2023 7.0  6.4 - 8.2 g/dL Final    AST 04/05/2023 20  9 - 39 U/L Final    Total Bilirubin 04/05/2023 1.0  0.0 - 1.2 mg/dL Final    ALT (SGPT) 04/05/2023 18  7 - 45 U/L Final     Patients treated with Sulfasalazine may generate    falsely decreased results for ALT.    Cholesterol 04/05/2023 214 (H)  0 - 199 mg/dL Final    .      AGE      DESIRABLE   BORDERLINE HIGH   HIGH     0-19 Y     0 - 169       170 - 199     >/= 200    20-24 Y     0 - 189       190 - 224     >/= 225         >24 Y     0 - 199       200 - 239     >/= 240   **All ranges are based on fasting samples. Specific   therapeutic targets will vary based on patient-specific   cardiac risk.  .   Pediatric guidelines reference:Pediatrics 2011, 128(S5).   Adult guidelines reference: NCEP ATPIII Guidelines,     SHANTANU 2001, 258:2486-97  .   Venipuncture immediately after or during the    administration of Metamizole may lead to falsely   low results. Testing should be performed immediately   prior to Metamizole dosing.    HDL 04/05/2023 72.9  mg/dL Final    .      AGE      VERY LOW   LOW     NORMAL    HIGH       0-19 Y       < 35   < 40     40-45     ----    20-24 Y       ----   < 40       >45     ----      >24 Y       ----   < 40     40-60      >60  .    Cholesterol/HDL Ratio 04/05/2023 2.9   Final    REF VALUES  DESIRABLE  < 3.4  HIGH RISK  > 5.0    LDL 04/05/2023 117 (H)  0 - 99 mg/dL Final    .                           NEAR      BORD      AGE      DESIRABLE  OPTIMAL    HIGH     HIGH     VERY HIGH     0-19 Y     0 - 109     ---    110-129   >/= 130     ----    20-24 Y     0 - 119     ---    120-159   >/= 160     ----      >24 Y     0 -  99   100-129  130-159   160-189      ">/=190  .    VLDL 04/05/2023 24  0 - 40 mg/dL Final    Triglycerides 04/05/2023 121  0 - 149 mg/dL Final    .      AGE      DESIRABLE   BORDERLINE HIGH   HIGH     VERY HIGH   0 D-90 D    19 - 174         ----         ----        ----  91 D- 9 Y     0 -  74        75 -  99     >/= 100      ----    10-19 Y     0 -  89        90 - 129     >/= 130      ----    20-24 Y     0 - 114       115 - 149     >/= 150      ----         >24 Y     0 - 149       150 - 199    200- 499    >/= 500  .   Venipuncture immediately after or during the    administration of Metamizole may lead to falsely   low results. Testing should be performed immediately   prior to Metamizole dosing.       Problem List Items Addressed This Visit       Class 2 severe obesity due to excess calories with serious comorbidity and body mass index (BMI) of 37.0 to 37.9 in adult (CMS/[facility])    Current Assessment & Plan     Continue decrease calorie diet and not more than 1500 calorie per day diet and low-fat diet.  Continue with regular exercise program.  We advised exercise at least 5 days a week for at least 45 minutes and also a minimum of 10,000 steps a day.  The detrimental effects of obesity on health were discussed.           Diarrhea, unspecified    Epigastric abdominal pain - Primary    Relevant Orders    Referral to General Surgery    Essential hypertension    Current Assessment & Plan     Dietary sodium restriction.  Regular aerobic exercise program is recommended to help achieve and maintain normal body weight, fitness and improve lipid balance. .  Dietary changes: Increase soluble fiber  Plant sterols 2grams per day (e.g. Benecol)  Reduce saturated fat, \"trans\" monounsaturated fatty acids, and cholesterol           Relevant Orders    Comprehensive Metabolic Panel    Lipid Panel    Follow Up In [facility] - PCP    Mixed hyperlipidemia    Current Assessment & Plan     The nature of cardiac risk has been fully discussed with this patient. " Discussed cardiovascular risk analysis and appropriate diet with the need for lifelong measures to reduce the risk. A regular exercise program is recommended to help achieve and maintain normal body weight, fitness and improve lipid balance. Patient education provided. They understand and agree with this course of treatment. They will return with new or worsening symptoms. Patient instructed to remain current with appropriate annual health maintenance.            Relevant Orders    Comprehensive Metabolic Panel    Lipid Panel    Follow Up In [facility] - PCP    Moderate episode of recurrent major depressive disorder (CMS/HCC)    Current Assessment & Plan     Chronic Condition Documentation : Stable based on symptoms and exam.  Continue established treatment plan and follow-up at least yearly.           Thrombophlebitis of anterior tibial vein, left (CMS/HCC)    Current Assessment & Plan     Chronic Condition Documentation : Stable based on symptoms and exam.  Continue established treatment plan and follow-up at least yearly.            Scribe Attestation  By signing my name below, IZana Scribe   attest that this documentation has been prepared under the direction and in the presence of Michael Gonzalez MD.    Patient was identified as a fall risk. Risk prevention instructions provided.

## 2023-05-18 NOTE — PATIENT INSTRUCTIONS
BMI was above normal measurement. Current weight: 86.1 kg (189 lb 12.8 oz)  Weight change since last visit (-) denotes wt loss 2.2 lbs   Weight loss needed to achieve BMI 25: 62.1 Lbs  Weight loss needed to achieve BMI 30: 36.5 Lbs  Advised to Increase physical activity.        Ways to Help Prevent Falls at Home    Quick Tips   ? Ask for help if you need it. Most people want to help!   ? Get up slowly after sitting or laying down   ? Wear a medical alert device or keep cell phone in your pocket   ? Use night lights, especially areas near a bathroom   ? Keep the items you use often within reach on a small stool or end table   ? Use an assistive device such as walker or cane, as directed by provider/physical therapy   ? Use a non-slip mat and grab bars in your bathroom. Look for home health sections for best options     Other Areas to Focus On   ? Exercise and nutrition: Regular exercise or taking a falls prevention class are great ways improve strength and balance. Don’t forget to stay hydrated and bring a snack!   ? Medicine side effects: Some medicines can make you sleepy or dizzy, which could cause a fall. Ask your healthcare provider about the side effects your medicines could cause. Be sure to let them know if you take any vitamins or supplements as well.   ? Tripping hazards: Remove items you could trip on, such as loose mats, rugs, cords, and clutter. Wear closed toe shoes with rubber soles.   ? Health and wellness: Get regular checkups with your healthcare provider, plus routine vision and hearing screenings. Talk with your healthcare provider about:   o Your medicines and the possible side effects - bring them in a bag if that is easier!   o Problems with balance or feeling dizzy   o Ways to promote bone health, such as Vitamin D and calcium supplements   o Questions or concerns about falling     *Ask your healthcare team if you have questions     [facility], 2022

## 2023-06-13 ENCOUNTER — HOSPITAL ENCOUNTER (OUTPATIENT)
Dept: DATA CONVERSION | Facility: HOSPITAL | Age: 68
End: 2023-06-13
Attending: SURGERY | Admitting: SURGERY
Payer: MEDICARE

## 2023-06-13 DIAGNOSIS — K29.70 GASTRITIS, UNSPECIFIED, WITHOUT BLEEDING: ICD-10-CM

## 2023-06-13 DIAGNOSIS — Z90.710 ACQUIRED ABSENCE OF BOTH CERVIX AND UTERUS: ICD-10-CM

## 2023-06-13 DIAGNOSIS — I10 ESSENTIAL (PRIMARY) HYPERTENSION: ICD-10-CM

## 2023-06-13 DIAGNOSIS — R10.13 EPIGASTRIC PAIN: ICD-10-CM

## 2023-06-13 DIAGNOSIS — E78.5 HYPERLIPIDEMIA, UNSPECIFIED: ICD-10-CM

## 2023-06-13 DIAGNOSIS — E66.9 OBESITY, UNSPECIFIED: ICD-10-CM

## 2023-06-15 LAB
ATRIAL RATE: 57 BPM
P AXIS: 22 DEGREES
P OFFSET: 174 MS
P ONSET: 120 MS
PR INTERVAL: 184 MS
Q ONSET: 212 MS
QRS COUNT: 9 BEATS
QRS DURATION: 92 MS
QT INTERVAL: 456 MS
QTC CALCULATION(BAZETT): 443 MS
QTC FREDERICIA: 448 MS
R AXIS: -29 DEGREES
T AXIS: 11 DEGREES
T OFFSET: 440 MS
VENTRICULAR RATE: 57 BPM

## 2023-06-21 DIAGNOSIS — R10.13 EPIGASTRIC PAIN: ICD-10-CM

## 2023-06-21 DIAGNOSIS — B00.1 COLD SORE: Primary | ICD-10-CM

## 2023-06-21 RX ORDER — PANTOPRAZOLE SODIUM 40 MG/1
TABLET, DELAYED RELEASE ORAL
Qty: 30 TABLET | Refills: 1 | Status: SHIPPED | OUTPATIENT
Start: 2023-06-21 | End: 2023-10-18 | Stop reason: SDUPTHER

## 2023-06-21 RX ORDER — VALACYCLOVIR HYDROCHLORIDE 1 G/1
TABLET, FILM COATED ORAL
Qty: 30 TABLET | Refills: 1 | Status: SHIPPED | OUTPATIENT
Start: 2023-06-21 | End: 2023-09-05

## 2023-06-26 LAB
COMPLETE PATHOLOGY REPORT: NORMAL
CONVERTED CLINICAL DIAGNOSIS-HISTORY: NORMAL
CONVERTED FINAL DIAGNOSIS: NORMAL
CONVERTED FINAL REPORT PDF LINK TO COPY AND PASTE: NORMAL
CONVERTED GROSS DESCRIPTION: NORMAL

## 2023-08-02 DIAGNOSIS — M25.552 HIP PAIN, LEFT: ICD-10-CM

## 2023-08-02 DIAGNOSIS — M47.16 LUMBAR SPONDYLOSIS WITH MYELOPATHY: ICD-10-CM

## 2023-08-02 RX ORDER — CELECOXIB 200 MG/1
CAPSULE ORAL
Qty: 30 CAPSULE | Refills: 3 | Status: SHIPPED | OUTPATIENT
Start: 2023-08-02 | End: 2023-11-02 | Stop reason: ALTCHOICE

## 2023-08-02 NOTE — TELEPHONE ENCOUNTER
Medication is pended for review-- approval/denial     Name:  Ragini MORALES David  :  [id number]    Specific Pharmacy location:  [facility]- [address]   Date of last appointment:  23  Date of next appointment:  10/18/23  Best number to reach patient:  [phone] (home)

## 2023-08-21 DIAGNOSIS — E87.6 HYPOKALEMIA: Primary | ICD-10-CM

## 2023-08-21 RX ORDER — POTASSIUM CHLORIDE 750 MG/1
10 TABLET, EXTENDED RELEASE ORAL 2 TIMES DAILY
Qty: 60 TABLET | Refills: 5 | Status: SHIPPED | OUTPATIENT
Start: 2023-08-21 | End: 2024-02-21

## 2023-08-21 NOTE — TELEPHONE ENCOUNTER
Recent Visits  Date Type Provider Dept   05/18/23 Office Visit Michael Gonzalez MD [id number]   04/05/23 Office Visit Michael Gonzalez MD Do [id number]   Showing recent visits within past 540 days and meeting all other requirements  Future Appointments  Date Type Provider Dept   10/18/23 Appointment Michael Gonzalez MD Do [id number]   Showing future appointments within next 180 days and meeting all other requirements

## 2023-09-30 NOTE — H&P
History & Physical Reviewed:   I have reviewed the History and Physical dated:  13-Jun-2023   History and Physical reviewed and relevant findings noted. Patient examined to review pertinent physical  findings.: No significant changes   Home Medications Reviewed: no changes noted   Allergies Reviewed: no changes noted       ERAS (Enhanced Recovery After Surgery):  ·  ERAS Patient: no     Consent:   COVID-19 Consent:  ·  COVID-19 Risk Consent Surgeon has reviewed key risks related to the risk of liz COVID-19 and if they contract COVID-19 what the risks are.       Electronic Signatures:  Angel Gaitan)  (Signed 13-Jun-2023 10:10)   Authored: History & Physical Reviewed, ERAS, Consent,  Note Completion      Last Updated: 13-Jun-2023 10:10 by Angel Gaitan)

## 2023-10-18 ENCOUNTER — LAB (OUTPATIENT)
Dept: LAB | Facility: LAB | Age: 68
End: 2023-10-18
Payer: MEDICARE

## 2023-10-18 ENCOUNTER — OFFICE VISIT (OUTPATIENT)
Dept: PRIMARY CARE | Facility: CLINIC | Age: 68
End: 2023-10-18
Payer: MEDICARE

## 2023-10-18 VITALS
SYSTOLIC BLOOD PRESSURE: 186 MMHG | HEIGHT: 60 IN | BODY MASS INDEX: 38.21 KG/M2 | DIASTOLIC BLOOD PRESSURE: 100 MMHG | OXYGEN SATURATION: 98 % | RESPIRATION RATE: 22 BRPM | TEMPERATURE: 97.4 F | HEART RATE: 61 BPM | WEIGHT: 194.6 LBS

## 2023-10-18 DIAGNOSIS — E78.2 MIXED HYPERLIPIDEMIA: ICD-10-CM

## 2023-10-18 DIAGNOSIS — K21.9 GASTRO-ESOPHAGEAL REFLUX DISEASE WITHOUT ESOPHAGITIS: ICD-10-CM

## 2023-10-18 DIAGNOSIS — I10 ESSENTIAL HYPERTENSION: ICD-10-CM

## 2023-10-18 DIAGNOSIS — E66.01 CLASS 2 SEVERE OBESITY DUE TO EXCESS CALORIES WITH SERIOUS COMORBIDITY AND BODY MASS INDEX (BMI) OF 38.0 TO 38.9 IN ADULT (MULTI): ICD-10-CM

## 2023-10-18 DIAGNOSIS — I10 ESSENTIAL HYPERTENSION: Primary | ICD-10-CM

## 2023-10-18 DIAGNOSIS — R27.0 ATAXIA: ICD-10-CM

## 2023-10-18 DIAGNOSIS — R51.9 ACUTE INTRACTABLE HEADACHE, UNSPECIFIED HEADACHE TYPE: ICD-10-CM

## 2023-10-18 DIAGNOSIS — R42 VERTIGO: ICD-10-CM

## 2023-10-18 DIAGNOSIS — R10.13 EPIGASTRIC PAIN: ICD-10-CM

## 2023-10-18 LAB
ALBUMIN SERPL BCP-MCNC: 4 G/DL (ref 3.4–5)
ALP SERPL-CCNC: 98 U/L (ref 33–136)
ALT SERPL W P-5'-P-CCNC: 20 U/L (ref 7–45)
ANION GAP SERPL CALC-SCNC: 10 MMOL/L (ref 10–20)
AST SERPL W P-5'-P-CCNC: 21 U/L (ref 9–39)
BILIRUB SERPL-MCNC: 0.5 MG/DL (ref 0–1.2)
BUN SERPL-MCNC: 19 MG/DL (ref 6–23)
CALCIUM SERPL-MCNC: 9.4 MG/DL (ref 8.6–10.3)
CHLORIDE SERPL-SCNC: 105 MMOL/L (ref 98–107)
CHOLEST SERPL-MCNC: 201 MG/DL (ref 0–199)
CHOLESTEROL/HDL RATIO: 2.4
CO2 SERPL-SCNC: 32 MMOL/L (ref 21–32)
CREAT SERPL-MCNC: 1.01 MG/DL (ref 0.5–1.05)
GFR SERPL CREATININE-BSD FRML MDRD: 60 ML/MIN/1.73M*2
GLUCOSE SERPL-MCNC: 107 MG/DL (ref 74–99)
HDLC SERPL-MCNC: 83.3 MG/DL
LDLC SERPL CALC-MCNC: 102 MG/DL
NON HDL CHOLESTEROL: 118 MG/DL (ref 0–149)
POTASSIUM SERPL-SCNC: 4.3 MMOL/L (ref 3.5–5.3)
PROT SERPL-MCNC: 6.9 G/DL (ref 6.4–8.2)
SODIUM SERPL-SCNC: 143 MMOL/L (ref 136–145)
TRIGL SERPL-MCNC: 81 MG/DL (ref 0–149)
VLDL: 16 MG/DL (ref 0–40)

## 2023-10-18 PROCEDURE — 3080F DIAST BP >= 90 MM HG: CPT | Performed by: FAMILY MEDICINE

## 2023-10-18 PROCEDURE — 1036F TOBACCO NON-USER: CPT | Performed by: FAMILY MEDICINE

## 2023-10-18 PROCEDURE — 80053 COMPREHEN METABOLIC PANEL: CPT

## 2023-10-18 PROCEDURE — 36415 COLL VENOUS BLD VENIPUNCTURE: CPT

## 2023-10-18 PROCEDURE — 3008F BODY MASS INDEX DOCD: CPT | Performed by: FAMILY MEDICINE

## 2023-10-18 PROCEDURE — 1159F MED LIST DOCD IN RCRD: CPT | Performed by: FAMILY MEDICINE

## 2023-10-18 PROCEDURE — 80061 LIPID PANEL: CPT

## 2023-10-18 PROCEDURE — 99215 OFFICE O/P EST HI 40 MIN: CPT | Performed by: FAMILY MEDICINE

## 2023-10-18 PROCEDURE — 3077F SYST BP >= 140 MM HG: CPT | Performed by: FAMILY MEDICINE

## 2023-10-18 PROCEDURE — 1160F RVW MEDS BY RX/DR IN RCRD: CPT | Performed by: FAMILY MEDICINE

## 2023-10-18 RX ORDER — PANTOPRAZOLE SODIUM 40 MG/1
40 TABLET, DELAYED RELEASE ORAL DAILY
Qty: 30 TABLET | Refills: 1 | Status: SHIPPED | OUTPATIENT
Start: 2023-10-18 | End: 2023-10-18 | Stop reason: SDUPTHER

## 2023-10-18 RX ORDER — PANTOPRAZOLE SODIUM 40 MG/1
40 TABLET, DELAYED RELEASE ORAL DAILY
Qty: 30 TABLET | Refills: 3 | Status: SHIPPED | OUTPATIENT
Start: 2023-10-18 | End: 2023-12-23

## 2023-10-18 RX ORDER — LISINOPRIL AND HYDROCHLOROTHIAZIDE 12.5; 2 MG/1; MG/1
1 TABLET ORAL 2 TIMES DAILY
Qty: 60 TABLET | Refills: 1 | Status: SHIPPED | OUTPATIENT
Start: 2023-10-18 | End: 2023-11-02 | Stop reason: SDUPTHER

## 2023-10-18 RX ORDER — METOPROLOL SUCCINATE 100 MG/1
100 TABLET, EXTENDED RELEASE ORAL DAILY
Qty: 30 TABLET | Refills: 1 | Status: SHIPPED | OUTPATIENT
Start: 2023-10-18 | End: 2023-11-02 | Stop reason: SDUPTHER

## 2023-10-18 ASSESSMENT — ANXIETY QUESTIONNAIRES
7. FEELING AFRAID AS IF SOMETHING AWFUL MIGHT HAPPEN: MORE THAN HALF THE DAYS
3. WORRYING TOO MUCH ABOUT DIFFERENT THINGS: SEVERAL DAYS
6. BECOMING EASILY ANNOYED OR IRRITABLE: NEARLY EVERY DAY
GAD7 TOTAL SCORE: 13
5. BEING SO RESTLESS THAT IT IS HARD TO SIT STILL: SEVERAL DAYS
1. FEELING NERVOUS, ANXIOUS, OR ON EDGE: SEVERAL DAYS
2. NOT BEING ABLE TO STOP OR CONTROL WORRYING: NEARLY EVERY DAY
4. TROUBLE RELAXING: MORE THAN HALF THE DAYS
IF YOU CHECKED OFF ANY PROBLEMS ON THIS QUESTIONNAIRE, HOW DIFFICULT HAVE THESE PROBLEMS MADE IT FOR YOU TO DO YOUR WORK, TAKE CARE OF THINGS AT HOME, OR GET ALONG WITH OTHER PEOPLE: SOMEWHAT DIFFICULT

## 2023-10-18 ASSESSMENT — PATIENT HEALTH QUESTIONNAIRE - PHQ9
2. FEELING DOWN, DEPRESSED OR HOPELESS: MORE THAN HALF THE DAYS
4. FEELING TIRED OR HAVING LITTLE ENERGY: NEARLY EVERY DAY
SUM OF ALL RESPONSES TO PHQ9 QUESTIONS 1 AND 2: 5
SUM OF ALL RESPONSES TO PHQ QUESTIONS 1-9: 19
7. TROUBLE CONCENTRATING ON THINGS, SUCH AS READING THE NEWSPAPER OR WATCHING TELEVISION: SEVERAL DAYS
1. LITTLE INTEREST OR PLEASURE IN DOING THINGS: NEARLY EVERY DAY
9. THOUGHTS THAT YOU WOULD BE BETTER OFF DEAD, OR OF HURTING YOURSELF: NOT AT ALL
6. FEELING BAD ABOUT YOURSELF - OR THAT YOU ARE A FAILURE OR HAVE LET YOURSELF OR YOUR FAMILY DOWN: MORE THAN HALF THE DAYS
8. MOVING OR SPEAKING SO SLOWLY THAT OTHER PEOPLE COULD HAVE NOTICED. OR THE OPPOSITE, BEING SO FIGETY OR RESTLESS THAT YOU HAVE BEEN MOVING AROUND A LOT MORE THAN USUAL: MORE THAN HALF THE DAYS
5. POOR APPETITE OR OVEREATING: NEARLY EVERY DAY
10. IF YOU CHECKED OFF ANY PROBLEMS, HOW DIFFICULT HAVE THESE PROBLEMS MADE IT FOR YOU TO DO YOUR WORK, TAKE CARE OF THINGS AT HOME, OR GET ALONG WITH OTHER PEOPLE: SOMEWHAT DIFFICULT
3. TROUBLE FALLING OR STAYING ASLEEP OR SLEEPING TOO MUCH: NEARLY EVERY DAY

## 2023-10-18 NOTE — PATIENT INSTRUCTIONS
BMI was above normal measurement. Current weight: 88.3 kg (194 lb 9.6 oz)  Weight change since last visit (-) denotes wt loss 8.6 lbs   Weight loss needed to achieve BMI 25: 66.9 Lbs  Weight loss needed to achieve BMI 30: 41.3 Lbs  Advised to Increase physical activity.

## 2023-10-18 NOTE — ASSESSMENT & PLAN NOTE
We will start her back on the Pantoprazole 40 mg daily.  Call if symptoms fail to improve as expected.    Follow-up if persistent or worsening symptoms otherwise when necessary.

## 2023-10-18 NOTE — ASSESSMENT & PLAN NOTE
"We will change the Lisinopril hydrochlorothiazide to 20/12.5 mg twice a day and increase Metoprolol XL to 100 mg daily.  We will schedule her for MA visit for blood pressure check in 1 week and follow up appointment in 2 weeks.  Dietary sodium restriction.  Regular aerobic exercise program is recommended to help achieve and maintain normal body weight, fitness and improve lipid balance. .  Dietary changes: Increase soluble fiber  Plant sterols 2grams per day (e.g. Benecol)  Reduce saturated fat, \"trans\" monounsaturated fatty acids, and cholesterol    "

## 2023-10-18 NOTE — ASSESSMENT & PLAN NOTE
We will order MRI of the Brain to be done ASAP.  Follow-up if persistent or worsening symptoms otherwise after MRI.

## 2023-10-18 NOTE — PROGRESS NOTES
Chief Complaint   Patient presents with    Follow-up     Hypertension, and Hyperlipidemia    Headache     Patient is here for follow-up on hypertension and hyperlipidemia. Patient denies chest pain, dyspnea, exertional chest pressure/discomfort, near-syncope, orthopnea, palpitations, paroxysmal nocturnal dyspnea, and syncope. Taking her medication regularly with no side effects.    Patient presents for evaluation of headache. Symptoms began about 1 week ago. Generally, the headaches last all day. The headaches do not seem to be related to any time of the day. The headaches are usually pounding and are located in the entire head. She states the headaches wake her up at night. Precipitating factors include: none which have been determined. The headaches are usually not preceded by an aura. Associated neurologic symptoms: dizziness, loss of balance, weakness, fatigue, and nausea. The patient denies speech difficulties. Home treatment has included acetaminophen and ibuprofen with little improvement. Other history includes: nothing pertinent. Family history includes no known family members with significant headaches.  She reports that her blood pressure was noted to be high when she went in for her colonoscopy.    She has been having a burning pain in the epigastrium which started yesterday.  She was previously on pantoprazole which she has not been taking.  She has also been taking ibuprofen in addition to the Celebrex she has for her persistent headache.  She notes that she has not been taking pantoprazole. She is currently feeling nauseous. No bloating, burping or belching or heartburn. Burning has been constant, she has diarrhea since yesterday sand this morning.      Past Medical History:   Diagnosis Date    Acute non-recurrent maxillary sinusitis 02/15/2023    Otalgia of both ears 02/15/2023     Patient Active Problem List    Diagnosis Date Noted    Acute intractable headache 10/18/2023    Vertigo 10/18/2023     Ataxia 10/18/2023    Epigastric abdominal pain 05/18/2023    Thrombophlebitis of anterior tibial vein, left (CMS/[facility]) 05/18/2023    Routine general medical examination at health care facility 04/05/2023    Diarrhea, unspecified 04/05/2023    Epigastric pain 04/05/2023    Diverticulosis 04/05/2023    Class 2 severe obesity due to excess calories with serious comorbidity and body mass index (BMI) of 38.0 to 38.9 in adult (CMS/[facility]) 04/05/2023    Lower abdominal pain 02/15/2023    Anxiety and depression 02/15/2023    Generalized anxiety disorder 02/15/2023    Bilateral leg edema 02/15/2023    Bladder disorder 02/15/2023    Chronic fatigue 02/15/2023    Chronic pharyngitis 02/15/2023    Cold sore 02/15/2023    Dysfunction of right eustachian tube 02/15/2023    Dyspnea on exertion 02/15/2023    Essential hypertension 02/15/2023    Gastro-esophageal reflux disease without esophagitis 02/15/2023    Bilateral pain of leg and foot 02/15/2023    Hip pain, left 02/15/2023    Hypokalemia 02/15/2023    Inguinal pain 02/15/2023    Left lower quadrant abdominal mass 02/15/2023    Mixed hyperlipidemia 02/15/2023    Moderate episode of recurrent major depressive disorder (CMS/[facility]) 02/15/2023    Muscle strain 02/15/2023    Vitamin D deficiency 02/15/2023    Xerosis cutis 02/15/2023     Past Surgical History:   Procedure Laterality Date    OTHER SURGICAL HISTORY  09/03/2020    Tonsillectomy    OTHER SURGICAL HISTORY  09/03/2020    Knee arthroscopy    OTHER SURGICAL HISTORY  09/03/2020    Hysterectomy    OTHER SURGICAL HISTORY  04/02/2021    Colonoscopy     No family history on file.    Social History     Socioeconomic History    Marital status:      Spouse name: None    Number of children: None    Years of education: None    Highest education level: None   Occupational History    None   Tobacco Use    Smoking status: Never    Smokeless tobacco: Never   Substance and Sexual Activity    Alcohol use: None    Drug use: None     Sexual activity: None   Other Topics Concern    None   Social History Narrative    None     Social Determinants of Health     Financial Resource Strain: Not on file   Food Insecurity: Not on file   Transportation Needs: Not on file   Physical Activity: Not on file   Stress: Not on file   Social Connections: Not on file   Intimate Partner Violence: Not on file   Housing Stability: Not on file     Current Outpatient Medications   Medication Sig Dispense Refill    buPROPion XL (Wellbutrin XL) 300 mg 24 hr tablet Take 1 tablet (300 mg) by mouth once daily. Do not crush, chew, or split.      celecoxib (CeleBREX) 200 mg capsule TAKE ONE CAPSULE BY MOUTH DAILY with a meal. discontinue meloxicam 30 capsule 3    furosemide (Lasix) 20 mg tablet Take 1 tablet (20 mg) by mouth once daily.      nitroglycerin (Nitrostat) 0.4 mg SL tablet Place 1 tablet (0.4 mg) under the tongue every 5 minutes if needed.      PARoxetine (Paxil) 40 mg tablet Take 1 tablet (40 mg) by mouth once daily.      potassium chloride CR 10 mEq ER tablet TAKE ONE TABLET BY MOUTH TWO TIMES A DAY 60 tablet 5    simvastatin (Zocor) 20 mg tablet Take 1 tablet (20 mg) by mouth once daily at bedtime. 30 tablet 5    valACYclovir (Valtrex) 1 gram tablet TAKE ONE TABLET BY MOUTH EVERY DAY 30 tablet 0    lisinopriL-hydrochlorothiazide 20-12.5 mg tablet Take 1 tablet by mouth 2 times a day. 60 tablet 1    metoprolol succinate XL (Toprol XL) 100 mg 24 hr tablet Take 1 tablet (100 mg) by mouth once daily. Do not crush or chew. 30 tablet 1    pantoprazole (ProtoNix) 40 mg EC tablet Take 1 tablet (40 mg) by mouth once daily. Do not crush, chew, or split 30 tablet 3     No current facility-administered medications for this visit.     Current Outpatient Medications on File Prior to Visit   Medication Sig Dispense Refill    buPROPion XL (Wellbutrin XL) 300 mg 24 hr tablet Take 1 tablet (300 mg) by mouth once daily. Do not crush, chew, or split.      celecoxib (CeleBREX) 200  mg capsule TAKE ONE CAPSULE BY MOUTH DAILY with a meal. discontinue meloxicam 30 capsule 3    furosemide (Lasix) 20 mg tablet Take 1 tablet (20 mg) by mouth once daily.      nitroglycerin (Nitrostat) 0.4 mg SL tablet Place 1 tablet (0.4 mg) under the tongue every 5 minutes if needed.      PARoxetine (Paxil) 40 mg tablet Take 1 tablet (40 mg) by mouth once daily.      potassium chloride CR 10 mEq ER tablet TAKE ONE TABLET BY MOUTH TWO TIMES A DAY 60 tablet 5    simvastatin (Zocor) 20 mg tablet Take 1 tablet (20 mg) by mouth once daily at bedtime. 30 tablet 5    valACYclovir (Valtrex) 1 gram tablet TAKE ONE TABLET BY MOUTH EVERY DAY 30 tablet 0    [DISCONTINUED] lisinopriL-hydrochlorothiazide 20-25 mg tablet TAKE ONE TABLET BY MOUTH ONCE DAILY 30 tablet 5    [DISCONTINUED] metoprolol succinate XL (Toprol-XL) 50 mg 24 hr tablet TAKE ONE TABLET BY MOUTH DAILY 30 tablet 5    [DISCONTINUED] pantoprazole (ProtoNix) 40 mg EC tablet TAKE ONE TABLET BY MOUTH DAILY. DO NOT CRUSH, CHEW, OR SPLIT (Patient not taking: Reported on 10/18/2023) 30 tablet 1     No current facility-administered medications on file prior to visit.     No Known Allergies       Review of Systems - General ROS: negative for - fever, malaise, night sweats or weight loss  ENT ROS: negative for - hearing change, nasal discharge, oral lesions, sinus pain, sore throat, tinnitus  Allergy and Immunology ROS: negative for - hives, nasal congestion or seasonal allergies  Respiratory ROS: negative for - cough, hemoptysis, pleuritic pain, shortness of breath or wheezing  Cardiovascular ROS: no chest pain or dyspnea on exertion, palpitations, PND or orthopnea.  No syncope.  Genito-Urinary ROS: no dysuria, trouble voiding, or hematuria  Dermatological ROS: negative for - dry skin, lumps, pruritus or rash  Musculoskeletal ROS: negative for - joint pain, joint stiffness, joint swelling, muscle pain  Psychological ROS: negative for - anxiety, concentration difficulties,  depression, memory difficulties or sleep disturbances  Endocrine ROS: negative for - hot flashes, malaise/lethargy, palpitations, polydipsia/polyuria, skin changes, temperature intolerance   Hematological and Lymphatic ROS: negative for - bruising, night sweats or pallor    Blood pressure (!) 186/100, pulse 61, temperature 36.3 °C (97.4 °F), resp. rate 22, height 1.524 m (5'), weight 88.3 kg (194 lb 9.6 oz), SpO2 98 %.    Physical Examination: General appearance - alert, well appearing, and in no distress  HEENT EOMI, PEERLA, normal eyelids.  Oropharynx no erythema or exudate.  Normal tonsils.    Ears -external auditory canal normal bilaterally.  Tympanic membranes normal bilaterally.  Mouth - mucous membranes moist, pharynx normal without lesions  Neck - supple, trachea central no thyromegaly.  No significant adenopathy  Chest -good air entry bilaterally, no rhonchi rales and no wheezes.  Heart -normal S1 and S2, regular rate and rhythm with no murmurs gallop or rub.  Abdomen obese soft with tenderness in the epigastrium.  No guarding or rebound tenderness.  No palpable mass.  Bowel sounds normal.  Bowel sounds normal.  No hernia.  Neurological - alert, oriented, cranial nerves II through XII normal.  Reflexes 2+ bilaterally.  No focal deficit.  Musculoskeletal - no joint tenderness, deformity or swelling  Extremities: peripheral pulses normal, no clubbing or cyanosis.    Lab on 04/05/2023   Component Date Value Ref Range Status    WBC 04/05/2023 7.3  4.4 - 11.3 x10E9/L Final    RBC 04/05/2023 5.00  4.00 - 5.20 x10E12/L Final    Hemoglobin 04/05/2023 15.4  12.0 - 16.0 g/dL Final    Hematocrit 04/05/2023 46.9 (H)  36.0 - 46.0 % Final    MCV 04/05/2023 94  80 - 100 fL Final    MCHC 04/05/2023 32.8  32.0 - 36.0 g/dL Final    Platelets 04/05/2023 268  150 - 450 x10E9/L Final    RDW 04/05/2023 12.8  11.5 - 14.5 % Final    Neutrophils % 04/05/2023 68.3  40.0 - 80.0 % Final    Immature Granulocytes %, Automated 04/05/2023  0.3  0.0 - 0.9 % Final     Immature Granulocyte Count (IG) includes promyelocytes,    myelocytes and metamyelocytes but does not include bands.   Percent differential counts (%) should be interpreted in the   context of the absolute cell counts (cells/L).    Lymphocytes % 04/05/2023 21.2  13.0 - 44.0 % Final    Monocytes % 04/05/2023 9.0  2.0 - 10.0 % Final    Eosinophils % 04/05/2023 0.8  0.0 - 6.0 % Final    Basophils % 04/05/2023 0.4  0.0 - 2.0 % Final    Neutrophils Absolute 04/05/2023 4.96  1.20 - 7.70 x10E9/L Final    Lymphocytes Absolute 04/05/2023 1.54  1.20 - 4.80 x10E9/L Final    Monocytes Absolute 04/05/2023 0.65  0.10 - 1.00 x10E9/L Final    Eosinophils Absolute 04/05/2023 0.06  0.00 - 0.70 x10E9/L Final    Basophils Absolute 04/05/2023 0.03  0.00 - 0.10 x10E9/L Final    Glucose 04/05/2023 116 (H)  74 - 99 mg/dL Final    Sodium 04/05/2023 144  136 - 145 mmol/L Final    Potassium 04/05/2023 4.2  3.5 - 5.3 mmol/L Final    Chloride 04/05/2023 102  98 - 107 mmol/L Final    Bicarbonate 04/05/2023 34 (H)  21 - 32 mmol/L Final    Anion Gap 04/05/2023 12  10 - 20 mmol/L Final    Urea Nitrogen 04/05/2023 19  6 - 23 mg/dL Final    Creatinine 04/05/2023 1.01  0.50 - 1.05 mg/dL Final    GFR Female 04/05/2023 60  >90 mL/min/1.73m2 Final     CALCULATIONS OF ESTIMATED GFR ARE PERFORMED   USING THE 2021 CKD-EPI STUDY REFIT EQUATION   WITHOUT THE RACE VARIABLE FOR THE IDMS-TRACEABLE   CREATININE METHODS.    https://jasn.asnjournals.org/content/early/2021/09/22/ASN.[email or web address]    Calcium 04/05/2023 9.7  8.6 - 10.3 mg/dL Final    Albumin 04/05/2023 4.3  3.4 - 5.0 g/dL Final    Alkaline Phosphatase 04/05/2023 95  33 - 136 U/L Final    Total Protein 04/05/2023 7.0  6.4 - 8.2 g/dL Final    AST 04/05/2023 20  9 - 39 U/L Final    Total Bilirubin 04/05/2023 1.0  0.0 - 1.2 mg/dL Final    ALT (SGPT) 04/05/2023 18  7 - 45 U/L Final     Patients treated with Sulfasalazine may generate    falsely decreased results for ALT.     Cholesterol 04/05/2023 214 (H)  0 - 199 mg/dL Final    .      AGE      DESIRABLE   BORDERLINE HIGH   HIGH     0-19 Y     0 - 169       170 - 199     >/= 200    20-24 Y     0 - 189       190 - 224     >/= 225         >24 Y     0 - 199       200 - 239     >/= 240   **All ranges are based on fasting samples. Specific   therapeutic targets will vary based on patient-specific   cardiac risk.  .   Pediatric guidelines reference:Pediatrics 2011, 128(S5).   Adult guidelines reference: NCEP ATPIII Guidelines,     SHANTANU 2001, 258:2486-97  .   Venipuncture immediately after or during the    administration of Metamizole may lead to falsely   low results. Testing should be performed immediately   prior to Metamizole dosing.    HDL 04/05/2023 72.9  mg/dL Final    .      AGE      VERY LOW   LOW     NORMAL    HIGH       0-19 Y       < 35   < 40     40-45     ----    20-24 Y       ----   < 40       >45     ----      >24 Y       ----   < 40     40-60      >60  .    Cholesterol/HDL Ratio 04/05/2023 2.9   Final    REF VALUES  DESIRABLE  < 3.4  HIGH RISK  > 5.0    LDL 04/05/2023 117 (H)  0 - 99 mg/dL Final    .                           NEAR      BORD      AGE      DESIRABLE  OPTIMAL    HIGH     HIGH     VERY HIGH     0-19 Y     0 - 109     ---    110-129   >/= 130     ----    20-24 Y     0 - 119     ---    120-159   >/= 160     ----      >24 Y     0 -  99   100-129  130-159   160-189     >/=190  .    VLDL 04/05/2023 24  0 - 40 mg/dL Final    Triglycerides 04/05/2023 121  0 - 149 mg/dL Final    .      AGE      DESIRABLE   BORDERLINE HIGH   HIGH     VERY HIGH   0 D-90 D    19 - 174         ----         ----        ----  91 D- 9 Y     0 -  74        75 -  99     >/= 100      ----    10-19 Y     0 -  89        90 - 129     >/= 130      ----    20-24 Y     0 - 114       115 - 149     >/= 150      ----         >24 Y     0 - 149       150 - 199    200- 499    >/= 500  .   Venipuncture immediately after or during the    administration of  Metamizole may lead to falsely   low results. Testing should be performed immediately   prior to Metamizole dosing.       Problem List Items Addressed This Visit       Acute intractable headache    Current Assessment & Plan     We will order MRI of the Brain to be done ASAP.  Follow-up if persistent or worsening symptoms otherwise after MRI.             Relevant Orders    MR brain wo IV contrast    Ataxia    Current Assessment & Plan     We will order MRI of the Brain to be done ASAP.  Follow-up if persistent or worsening symptoms otherwise after MRI.         Relevant Orders    MR brain wo IV contrast    Class 2 severe obesity due to excess calories with serious comorbidity and body mass index (BMI) of 38.0 to 38.9 in adult (CMS/[facility])    Current Assessment & Plan     Continue decrease calorie diet and not more than 1500 calorie per day diet and low-fat diet.  Continue with regular exercise program.  We advised exercise at least 5 days a week for at least 45 minutes and also a minimum of 10,000 steps a day.  The detrimental effects of obesity on health were discussed.           Epigastric pain    Current Assessment & Plan     We will start her back on the Pantoprazole 40 mg daily.  Call if symptoms fail to improve as expected.    Follow-up if persistent or worsening symptoms otherwise when necessary.         Relevant Medications    pantoprazole (ProtoNix) 40 mg EC tablet    Essential hypertension - Primary    Current Assessment & Plan     We will change the Lisinopril hydrochlorothiazide to 20/12.5 mg twice a day and increase Metoprolol XL to 100 mg daily.  We will schedule her for MA visit for blood pressure check in 1 week and follow up appointment in 2 weeks.  Dietary sodium restriction.  Regular aerobic exercise program is recommended to help achieve and maintain normal body weight, fitness and improve lipid balance. .  Dietary changes: Increase soluble fiber  Plant sterols 2grams per day (e.g. Benecol)  Reduce  "saturated fat, \"trans\" monounsaturated fatty acids, and cholesterol           Relevant Medications    lisinopriL-hydrochlorothiazide 20-12.5 mg tablet    metoprolol succinate XL (Toprol XL) 100 mg 24 hr tablet    Other Relevant Orders    Follow Up In [facility] - PCP - Nurse Visit    Follow Up In [facility] - PCP - Established    Gastro-esophageal reflux disease without esophagitis    Current Assessment & Plan     We will start her back on the Pantoprazole 40 mg daily.  Call if symptoms fail to improve as expected.    Follow-up if persistent or worsening symptoms otherwise when necessary.           Mixed hyperlipidemia    Current Assessment & Plan     Stable, continue current medications and management.           Vertigo    Current Assessment & Plan     We will order MRI of the Brain to be done ASAP.  Follow-up if persistent or worsening symptoms otherwise after MRI.         Relevant Orders    MR brain wo IV contrast     Scribe Attestation  By signing my name below, I, Lang Arana   attest that this documentation has been prepared under the direction and in the presence of Michael Gonzalez MD.  "

## 2023-10-21 ENCOUNTER — HOSPITAL ENCOUNTER (OUTPATIENT)
Dept: RADIOLOGY | Facility: HOSPITAL | Age: 68
Discharge: HOME | End: 2023-10-21
Payer: MEDICARE

## 2023-10-21 DIAGNOSIS — R51.9 ACUTE INTRACTABLE HEADACHE, UNSPECIFIED HEADACHE TYPE: ICD-10-CM

## 2023-10-21 DIAGNOSIS — E78.2 MIXED HYPERLIPIDEMIA: ICD-10-CM

## 2023-10-21 DIAGNOSIS — R42 VERTIGO: ICD-10-CM

## 2023-10-21 DIAGNOSIS — R27.0 ATAXIA: ICD-10-CM

## 2023-10-21 PROCEDURE — 70551 MRI BRAIN STEM W/O DYE: CPT | Mod: ME

## 2023-10-21 PROCEDURE — 70551 MRI BRAIN STEM W/O DYE: CPT | Performed by: RADIOLOGY

## 2023-10-21 RX ORDER — LISINOPRIL AND HYDROCHLOROTHIAZIDE 20; 25 MG/1; MG/1
TABLET ORAL
Qty: 30 TABLET | Refills: 0 | OUTPATIENT
Start: 2023-10-21

## 2023-10-21 RX ORDER — METOPROLOL SUCCINATE 50 MG/1
TABLET, EXTENDED RELEASE ORAL
Qty: 30 TABLET | Refills: 0 | OUTPATIENT
Start: 2023-10-21

## 2023-10-25 ENCOUNTER — CLINICAL SUPPORT (OUTPATIENT)
Dept: PRIMARY CARE | Facility: CLINIC | Age: 68
End: 2023-10-25
Payer: MEDICARE

## 2023-10-25 VITALS — DIASTOLIC BLOOD PRESSURE: 90 MMHG | SYSTOLIC BLOOD PRESSURE: 154 MMHG | OXYGEN SATURATION: 97 % | HEART RATE: 64 BPM

## 2023-10-25 DIAGNOSIS — I10 ESSENTIAL HYPERTENSION: ICD-10-CM

## 2023-10-25 NOTE — PROGRESS NOTES
Patient here for BP check left arm.  BP-154/90.  Pulse-64.  Ox-97.   Patient states headaches are not as bad as before, she is no longer vomiting.  States no new issues.

## 2023-11-02 ENCOUNTER — OFFICE VISIT (OUTPATIENT)
Dept: PRIMARY CARE | Facility: CLINIC | Age: 68
End: 2023-11-02
Payer: MEDICARE

## 2023-11-02 VITALS
SYSTOLIC BLOOD PRESSURE: 150 MMHG | DIASTOLIC BLOOD PRESSURE: 90 MMHG | HEIGHT: 60 IN | WEIGHT: 198.6 LBS | OXYGEN SATURATION: 99 % | RESPIRATION RATE: 17 BRPM | TEMPERATURE: 97.5 F | HEART RATE: 61 BPM | BODY MASS INDEX: 38.99 KG/M2

## 2023-11-02 DIAGNOSIS — I10 ESSENTIAL HYPERTENSION: Primary | ICD-10-CM

## 2023-11-02 DIAGNOSIS — E66.01 CLASS 2 SEVERE OBESITY DUE TO EXCESS CALORIES WITH SERIOUS COMORBIDITY AND BODY MASS INDEX (BMI) OF 38.0 TO 38.9 IN ADULT (MULTI): ICD-10-CM

## 2023-11-02 PROCEDURE — 3008F BODY MASS INDEX DOCD: CPT | Performed by: FAMILY MEDICINE

## 2023-11-02 PROCEDURE — 1159F MED LIST DOCD IN RCRD: CPT | Performed by: FAMILY MEDICINE

## 2023-11-02 PROCEDURE — 3078F DIAST BP <80 MM HG: CPT | Performed by: FAMILY MEDICINE

## 2023-11-02 PROCEDURE — 1036F TOBACCO NON-USER: CPT | Performed by: FAMILY MEDICINE

## 2023-11-02 PROCEDURE — 1160F RVW MEDS BY RX/DR IN RCRD: CPT | Performed by: FAMILY MEDICINE

## 2023-11-02 PROCEDURE — 3077F SYST BP >= 140 MM HG: CPT | Performed by: FAMILY MEDICINE

## 2023-11-02 PROCEDURE — 99213 OFFICE O/P EST LOW 20 MIN: CPT | Performed by: FAMILY MEDICINE

## 2023-11-02 RX ORDER — METOPROLOL SUCCINATE 100 MG/1
100 TABLET, EXTENDED RELEASE ORAL DAILY
Qty: 30 TABLET | Refills: 1 | Status: SHIPPED | OUTPATIENT
Start: 2023-11-02 | End: 2023-12-14 | Stop reason: SDUPTHER

## 2023-11-02 RX ORDER — AMLODIPINE BESYLATE 5 MG/1
5 TABLET ORAL DAILY
Qty: 30 TABLET | Refills: 1 | Status: SHIPPED | OUTPATIENT
Start: 2023-11-02 | End: 2023-12-14 | Stop reason: SDUPTHER

## 2023-11-02 RX ORDER — LISINOPRIL AND HYDROCHLOROTHIAZIDE 12.5; 2 MG/1; MG/1
1 TABLET ORAL 2 TIMES DAILY
Qty: 60 TABLET | Refills: 1 | Status: SHIPPED | OUTPATIENT
Start: 2023-11-02 | End: 2023-12-14 | Stop reason: SDUPTHER

## 2023-11-02 ASSESSMENT — ENCOUNTER SYMPTOMS
DYSURIA: 0
FEVER: 0
DIARRHEA: 0
HEMATURIA: 0
RHINORRHEA: 0
TREMORS: 0
CHILLS: 0
VOMITING: 0
ABDOMINAL PAIN: 0
NUMBNESS: 0
FREQUENCY: 0
DIZZINESS: 0
SHORTNESS OF BREATH: 0
PALPITATIONS: 0
SORE THROAT: 0
CONSTIPATION: 0
WHEEZING: 0
COUGH: 0
HEADACHES: 0

## 2023-11-02 ASSESSMENT — PATIENT HEALTH QUESTIONNAIRE - PHQ9
1. LITTLE INTEREST OR PLEASURE IN DOING THINGS: NOT AT ALL
2. FEELING DOWN, DEPRESSED OR HOPELESS: NOT AT ALL
SUM OF ALL RESPONSES TO PHQ9 QUESTIONS 1 AND 2: 0

## 2023-11-02 NOTE — ASSESSMENT & PLAN NOTE
"We will start her on Amlodipine 5 mg daily and continue her other medications.  Dietary sodium restriction.  Regular aerobic exercise program is recommended to help achieve and maintain normal body weight, fitness and improve lipid balance. .  Dietary changes: Increase soluble fiber  Plant sterols 2grams per day (e.g. Benecol)  Reduce saturated fat, \"trans\" monounsaturated fatty acids, and cholesterol  "

## 2023-11-02 NOTE — PROGRESS NOTES
Chief Complaint   Patient presents with    Follow-up     Hypertension       Subjective   Patient ID: Ragini Santos is a 68 y.o. female who presents for Follow-up (Hypertension).    Patient is here for follow-up on hypertension. Blood pressure is well controlled at home. Cardiac symptoms: none. She is still having headaches which are mild now. She has no chest pain, dyspnea, exertional chest pressure/discomfort, near-syncope, orthopnea, palpitations, paroxysmal nocturnal dyspnea, and syncope. Use of agents associated with hypertension: none. History of target organ damage: none.       Review of Systems   Constitutional:  Negative for chills and fever.   HENT:  Negative for congestion, ear pain, nosebleeds, rhinorrhea and sore throat.    Respiratory:  Negative for cough, shortness of breath and wheezing.    Cardiovascular:  Negative for chest pain, palpitations and leg swelling.   Gastrointestinal:  Negative for abdominal pain, constipation, diarrhea and vomiting.   Genitourinary:  Negative for dysuria, frequency and hematuria.   Neurological:  Negative for dizziness, tremors, numbness and headaches.       Objective   /90   Pulse 61   Temp 36.4 °C (97.5 °F)   Resp 17   Ht 1.524 m (5')   Wt 90.1 kg (198 lb 9.6 oz)   SpO2 99%   BMI 38.79 kg/m²     Physical Exam    Assessment/Plan   Problem List Items Addressed This Visit       Class 2 severe obesity due to excess calories with serious comorbidity and body mass index (BMI) of 38.0 to 38.9 in adult (CMS/HCC)     Continue decrease calorie diet and not more than 1500 calorie per day diet and low-fat diet.  Continue with regular exercise program.  We advised exercise at least 5 days a week for at least 45 minutes and also a minimum of 10,000 steps a day.  The detrimental effects of obesity on health were discussed.           Essential hypertension - Primary     We will start her on Amlodipine 5 mg daily and continue her other medications.  Dietary sodium  "restriction.  Regular aerobic exercise program is recommended to help achieve and maintain normal body weight, fitness and improve lipid balance. .  Dietary changes: Increase soluble fiber  Plant sterols 2grams per day (e.g. Benecol)  Reduce saturated fat, \"trans\" monounsaturated fatty acids, and cholesterol         Relevant Medications    amLODIPine (Norvasc) 5 mg tablet    lisinopriL-hydrochlorothiazide 20-12.5 mg tablet    metoprolol succinate XL (Toprol XL) 100 mg 24 hr tablet    Other Relevant Orders    Follow Up In [facility] - PCP - Established    Follow Up In [facility] - PCP - Nurse Visit     Scribe Attestation  By signing my name below, I, Lang Arana   attest that this documentation has been prepared under the direction and in the presence of Michael Gonzalez MD.  "

## 2023-11-02 NOTE — PATIENT INSTRUCTIONS
BMI was above normal measurement. Current weight: 90.1 kg (198 lb 9.6 oz)  Weight change since last visit (-) denotes wt loss 4 lbs   Weight loss needed to achieve BMI 25: 70.9 Lbs  Weight loss needed to achieve BMI 30: 45.3 Lbs  Advised to Increase physical activity.

## 2023-11-24 ENCOUNTER — CLINICAL SUPPORT (OUTPATIENT)
Dept: PRIMARY CARE | Facility: CLINIC | Age: 68
End: 2023-11-24
Payer: MEDICARE

## 2023-11-24 VITALS — SYSTOLIC BLOOD PRESSURE: 166 MMHG | DIASTOLIC BLOOD PRESSURE: 72 MMHG

## 2023-11-24 DIAGNOSIS — I10 ESSENTIAL HYPERTENSION: ICD-10-CM

## 2023-11-24 NOTE — PROGRESS NOTES
Patient presents for a BP check.   Denies any chest pain, SOB, palpitations, admits to headaches.   BP taken manually on right arm .   Patient has been taking all medications as instructed by PCP.     Initial BP- 178/64  After 5 minutes at rest - 166/72

## 2023-11-28 ENCOUNTER — TELEPHONE (OUTPATIENT)
Dept: PRIMARY CARE | Facility: CLINIC | Age: 68
End: 2023-11-28
Payer: MEDICARE

## 2023-11-28 NOTE — TELEPHONE ENCOUNTER
----- Message from Michael Gonzalez MD sent at 11/24/2023 12:19 PM EST -----  Continue current medications and follow-up as scheduled on 12/14/2023  ----- Message -----  From: Kaela Borjas MA  Sent: 11/24/2023  11:16 AM EST  To: Michael Gonzalez MD

## 2023-12-02 DIAGNOSIS — E78.2 MIXED HYPERLIPIDEMIA: ICD-10-CM

## 2023-12-02 RX ORDER — SIMVASTATIN 20 MG/1
20 TABLET, FILM COATED ORAL NIGHTLY
Qty: 30 TABLET | Refills: 0 | Status: SHIPPED | OUTPATIENT
Start: 2023-12-02 | End: 2023-12-14 | Stop reason: SDUPTHER

## 2023-12-14 ENCOUNTER — OFFICE VISIT (OUTPATIENT)
Dept: PRIMARY CARE | Facility: CLINIC | Age: 68
End: 2023-12-14
Payer: MEDICARE

## 2023-12-14 VITALS
OXYGEN SATURATION: 98 % | TEMPERATURE: 97.7 F | WEIGHT: 200 LBS | SYSTOLIC BLOOD PRESSURE: 126 MMHG | HEIGHT: 60 IN | HEART RATE: 71 BPM | RESPIRATION RATE: 16 BRPM | DIASTOLIC BLOOD PRESSURE: 74 MMHG | BODY MASS INDEX: 39.27 KG/M2

## 2023-12-14 DIAGNOSIS — Z12.31 ENCOUNTER FOR SCREENING MAMMOGRAM FOR BREAST CANCER: ICD-10-CM

## 2023-12-14 DIAGNOSIS — E78.2 MIXED HYPERLIPIDEMIA: ICD-10-CM

## 2023-12-14 DIAGNOSIS — E66.01 CLASS 2 SEVERE OBESITY DUE TO EXCESS CALORIES WITH SERIOUS COMORBIDITY AND BODY MASS INDEX (BMI) OF 39.0 TO 39.9 IN ADULT (MULTI): ICD-10-CM

## 2023-12-14 DIAGNOSIS — I10 ESSENTIAL HYPERTENSION: Primary | ICD-10-CM

## 2023-12-14 PROCEDURE — 99213 OFFICE O/P EST LOW 20 MIN: CPT | Performed by: FAMILY MEDICINE

## 2023-12-14 PROCEDURE — 1160F RVW MEDS BY RX/DR IN RCRD: CPT | Performed by: FAMILY MEDICINE

## 2023-12-14 PROCEDURE — 3074F SYST BP LT 130 MM HG: CPT | Performed by: FAMILY MEDICINE

## 2023-12-14 PROCEDURE — 3008F BODY MASS INDEX DOCD: CPT | Performed by: FAMILY MEDICINE

## 2023-12-14 PROCEDURE — 3078F DIAST BP <80 MM HG: CPT | Performed by: FAMILY MEDICINE

## 2023-12-14 PROCEDURE — 1159F MED LIST DOCD IN RCRD: CPT | Performed by: FAMILY MEDICINE

## 2023-12-14 PROCEDURE — 1036F TOBACCO NON-USER: CPT | Performed by: FAMILY MEDICINE

## 2023-12-14 RX ORDER — AMLODIPINE BESYLATE 5 MG/1
5 TABLET ORAL DAILY
Qty: 30 TABLET | Refills: 3 | Status: SHIPPED | OUTPATIENT
Start: 2023-12-14 | End: 2024-05-07 | Stop reason: SDUPTHER

## 2023-12-14 RX ORDER — LISINOPRIL AND HYDROCHLOROTHIAZIDE 12.5; 2 MG/1; MG/1
1 TABLET ORAL 2 TIMES DAILY
Qty: 60 TABLET | Refills: 3 | Status: SHIPPED | OUTPATIENT
Start: 2023-12-14 | End: 2024-02-16 | Stop reason: SDUPTHER

## 2023-12-14 RX ORDER — SIMVASTATIN 20 MG/1
20 TABLET, FILM COATED ORAL NIGHTLY
Qty: 30 TABLET | Refills: 3 | Status: SHIPPED | OUTPATIENT
Start: 2023-12-14 | End: 2024-05-07 | Stop reason: SDUPTHER

## 2023-12-14 RX ORDER — METOPROLOL SUCCINATE 100 MG/1
100 TABLET, EXTENDED RELEASE ORAL DAILY
Qty: 30 TABLET | Refills: 3 | Status: SHIPPED | OUTPATIENT
Start: 2023-12-14 | End: 2024-05-07 | Stop reason: SDUPTHER

## 2023-12-14 ASSESSMENT — PATIENT HEALTH QUESTIONNAIRE - PHQ9
1. LITTLE INTEREST OR PLEASURE IN DOING THINGS: NOT AT ALL
SUM OF ALL RESPONSES TO PHQ9 QUESTIONS 1 AND 2: 0
2. FEELING DOWN, DEPRESSED OR HOPELESS: NOT AT ALL

## 2023-12-14 NOTE — PROGRESS NOTES
Subjective   Patient ID: Ragini Santos is a 68 y.o. female who presents for Follow-up (Hypertension).    Patient is here for follow-up on hypertension. Cardiac symptoms: none. She has no chest pain, dyspnea, exertional chest pressure/discomfort, near-syncope, orthopnea, palpitations, paroxysmal nocturnal dyspnea, and syncope. Use of agents associated with hypertension: none. History of target organ damage: none.      Review of Systems   Constitutional:  Negative for chills and fever.   HENT:  Negative for congestion, ear pain, nosebleeds, rhinorrhea and sore throat.    Respiratory:  Negative for cough, shortness of breath and wheezing.    Cardiovascular:  Negative for chest pain, palpitations and leg swelling.   Gastrointestinal:  Negative for abdominal pain, constipation, diarrhea and vomiting.   Genitourinary:  Negative for dysuria, frequency and hematuria.   Neurological:  Negative for dizziness, tremors, numbness and headaches.       Objective   /74   Pulse 71   Temp 36.5 °C (97.7 °F)   Resp 16   Ht 1.524 m (5')   Wt 90.7 kg (200 lb)   SpO2 98%   BMI 39.06 kg/m²     Physical Exam  Constitutional:       General: She is not in acute distress.     Appearance: Normal appearance.   HENT:      Head: Normocephalic and atraumatic.      Mouth/Throat:      Mouth: Mucous membranes are moist.      Pharynx: Oropharynx is clear. No oropharyngeal exudate or posterior oropharyngeal erythema.   Eyes:      General: No scleral icterus.     Extraocular Movements: Extraocular movements intact.      Pupils: Pupils are equal, round, and reactive to light.   Cardiovascular:      Rate and Rhythm: Normal rate and regular rhythm.      Pulses: Normal pulses.      Heart sounds: No murmur heard.     No friction rub. No gallop.   Pulmonary:      Effort: Pulmonary effort is normal.      Breath sounds: No wheezing, rhonchi or rales.   Skin:     General: Skin is warm.      Coloration: Skin is not jaundiced or pale.    Neurological:      General: No focal deficit present.      Mental Status: She is alert and oriented to person, place, and time.       Lab on 10/18/2023   Component Date Value Ref Range Status    Glucose 10/18/2023 107 (H)  74 - 99 mg/dL Final    Sodium 10/18/2023 143  136 - 145 mmol/L Final    Potassium 10/18/2023 4.3  3.5 - 5.3 mmol/L Final    Chloride 10/18/2023 105  98 - 107 mmol/L Final    Bicarbonate 10/18/2023 32  21 - 32 mmol/L Final    Anion Gap 10/18/2023 10  10 - 20 mmol/L Final    Urea Nitrogen 10/18/2023 19  6 - 23 mg/dL Final    Creatinine 10/18/2023 1.01  0.50 - 1.05 mg/dL Final    eGFR 10/18/2023 60 (L)  >60 mL/min/1.73m*2 Final    Calculations of estimated GFR are performed using the 2021 CKD-EPI Study Refit equation without the race variable for the IDMS-Traceable creatinine methods.  https://jasn.asnjournals.org/content/early/2021/09/22/ASN.8735080733    Calcium 10/18/2023 9.4  8.6 - 10.3 mg/dL Final    Albumin 10/18/2023 4.0  3.4 - 5.0 g/dL Final    Alkaline Phosphatase 10/18/2023 98  33 - 136 U/L Final    Total Protein 10/18/2023 6.9  6.4 - 8.2 g/dL Final    AST 10/18/2023 21  9 - 39 U/L Final    Bilirubin, Total 10/18/2023 0.5  0.0 - 1.2 mg/dL Final    ALT 10/18/2023 20  7 - 45 U/L Final    Patients treated with Sulfasalazine may generate falsely decreased results for ALT.    Cholesterol 10/18/2023 201 (H)  0 - 199 mg/dL Final          Age      Desirable   Borderline High   High     0-19 Y     0 - 169       170 - 199     >/= 200    20-24 Y     0 - 189       190 - 224     >/= 225         >24 Y     0 - 199       200 - 239     >/= 240   **All ranges are based on fasting samples. Specific   therapeutic targets will vary based on patient-specific   cardiac risk.    Pediatric guidelines reference:Pediatrics 2011, 128(S5).Adult guidelines reference: NCEP ATPIII Guidelines,SHANTANU 2001, 258:2486-97    Venipuncture immediately after or during the administration of Metamizole may lead to falsely low  results. Testing should be performed immediately prior to Metamizole dosing.    HDL-Cholesterol 10/18/2023 83.3  mg/dL Final      Age       Very Low   Low     Normal    High    0-19 Y    < 35      < 40     40-45     ----  20-24 Y    ----     < 40      >45      ----        >24 Y      ----     < 40     40-60      >60      Cholesterol/HDL Ratio 10/18/2023 2.4   Final      Ref Values  Desirable  < 3.4  High Risk  > 5.0    LDL Calculated 10/18/2023 102 (H)  <=99 mg/dL Final                                Near   Borderline      AGE      Desirable  Optimal    High     High     Very High     0-19 Y     0 - 109     ---    110-129   >/= 130     ----    20-24 Y     0 - 119     ---    120-159   >/= 160     ----      >24 Y     0 -  99   100-129  130-159   160-189     >/=190      VLDL 10/18/2023 16  0 - 40 mg/dL Final    Triglycerides 10/18/2023 81  0 - 149 mg/dL Final       Age         Desirable   Borderline High   High     Very High   0 D-90 D    19 - 174         ----         ----        ----  91 D- 9 Y     0 -  74        75 -  99     >/= 100      ----    10-19 Y     0 -  89        90 - 129     >/= 130      ----    20-24 Y     0 - 114       115 - 149     >/= 150      ----         >24 Y     0 - 149       150 - 199    200- 499    >/= 500    Venipuncture immediately after or during the administration of Metamizole may lead to falsely low results. Testing should be performed immediately prior to Metamizole dosing.    Non HDL Cholesterol 10/18/2023 118  0 - 149 mg/dL Final          Age       Desirable   Borderline High   High     Very High     0-19 Y     0 - 119       120 - 144     >/= 145    >/= 160    20-24 Y     0 - 149       150 - 189     >/= 190      ----         >24 Y    30 mg/dL above LDL Cholesterol goal           Assessment/Plan   Problem List Items Addressed This Visit       Class 2 severe obesity due to excess calories with serious comorbidity and body mass index (BMI) of 39.0 to 39.9 in adult (CMS/[facility])     Continue  "decrease calorie diet and not more than 1500 calorie per day diet and low-fat diet.  Continue with regular exercise program.  We advised exercise at least 5 days a week for at least 45 minutes and also a minimum of 10,000 steps a day.  The detrimental effects of obesity on health were discussed.         Essential hypertension - Primary     Well-controlled, continue current medications and management.  Dietary sodium restriction.  Regular aerobic exercise program is recommended to help achieve and maintain normal body weight, fitness and improve lipid balance. .  Dietary changes: Increase soluble fiber  Plant sterols 2grams per day (e.g. Benecol)  Reduce saturated fat, \"trans\" monounsaturated fatty acids, and cholesterol         Relevant Medications    lisinopriL-hydrochlorothiazide 20-12.5 mg tablet    amLODIPine (Norvasc) 5 mg tablet    metoprolol succinate XL (Toprol XL) 100 mg 24 hr tablet    Other Relevant Orders    Follow Up In [facility] - PCP - Established    CBC and Auto Differential    Comprehensive Metabolic Panel    Lipid Panel    Mixed hyperlipidemia     The nature of cardiac risk has been fully discussed with this patient. Discussed cardiovascular risk analysis and appropriate diet with the need for lifelong measures to reduce the risk. A regular exercise program is recommended to help achieve and maintain normal body weight, fitness and improve lipid balance. Patient education provided. They understand and agree with this course of treatment. They will return with new or worsening symptoms. Patient instructed to remain current with appropriate annual health maintenance.          Relevant Medications    simvastatin (Zocor) 20 mg tablet     Other Visit Diagnoses       Encounter for screening mammogram for breast cancer        Relevant Orders    BI mammo bilateral screening tomosynthesis          Scribe Attestation  By signing my name below, IZana Scribe   attest that this " documentation has been prepared under the direction and in the presence of Michael Gonzalez MD.    Patient was identified as a fall risk. Risk prevention instructions provided.

## 2023-12-14 NOTE — PATIENT INSTRUCTIONS
BMI was above normal measurement. Current weight: 90.7 kg (200 lb)  Weight change since last visit (-) denotes wt loss 1.4 lbs   Weight loss needed to achieve BMI 25: 72.3 Lbs  Weight loss needed to achieve BMI 30: 46.7 Lbs  Advised to Increase physical activity.        Ways to Help Prevent Falls at Home    Quick Tips   ? Ask for help if you need it. Most people want to help!   ? Get up slowly after sitting or laying down   ? Wear a medical alert device or keep cell phone in your pocket   ? Use night lights, especially areas near a bathroom   ? Keep the items you use often within reach on a small stool or end table   ? Use an assistive device such as walker or cane, as directed by provider/physical therapy   ? Use a non-slip mat and grab bars in your bathroom. Look for home health sections for best options     Other Areas to Focus On   ? Exercise and nutrition: Regular exercise or taking a falls prevention class are great ways improve strength and balance. Don’t forget to stay hydrated and bring a snack!   ? Medicine side effects: Some medicines can make you sleepy or dizzy, which could cause a fall. Ask your healthcare provider about the side effects your medicines could cause. Be sure to let them know if you take any vitamins or supplements as well.   ? Tripping hazards: Remove items you could trip on, such as loose mats, rugs, cords, and clutter. Wear closed toe shoes with rubber soles.   ? Health and wellness: Get regular checkups with your healthcare provider, plus routine vision and hearing screenings. Talk with your healthcare provider about:   o Your medicines and the possible side effects - bring them in a bag if that is easier!   o Problems with balance or feeling dizzy   o Ways to promote bone health, such as Vitamin D and calcium supplements   o Questions or concerns about falling     *Ask your healthcare team if you have questions     ©[facility], 2022

## 2023-12-16 ASSESSMENT — ENCOUNTER SYMPTOMS
WHEEZING: 0
TREMORS: 0
SORE THROAT: 0
FREQUENCY: 0
FEVER: 0
PALPITATIONS: 0
COUGH: 0
RHINORRHEA: 0
ABDOMINAL PAIN: 0
VOMITING: 0
DIARRHEA: 0
CONSTIPATION: 0
DIZZINESS: 0
DYSURIA: 0
SHORTNESS OF BREATH: 0
NUMBNESS: 0
CHILLS: 0
HEADACHES: 0
HEMATURIA: 0

## 2023-12-23 DIAGNOSIS — R10.13 EPIGASTRIC PAIN: ICD-10-CM

## 2023-12-23 RX ORDER — PANTOPRAZOLE SODIUM 40 MG/1
40 TABLET, DELAYED RELEASE ORAL DAILY
Qty: 30 TABLET | Refills: 3 | Status: SHIPPED | OUTPATIENT
Start: 2023-12-23 | End: 2024-05-07 | Stop reason: SDUPTHER

## 2023-12-26 ENCOUNTER — TELEPHONE (OUTPATIENT)
Dept: PRIMARY CARE | Facility: CLINIC | Age: 68
End: 2023-12-26

## 2023-12-26 ENCOUNTER — OFFICE VISIT (OUTPATIENT)
Dept: PRIMARY CARE | Facility: CLINIC | Age: 68
End: 2023-12-26
Payer: MEDICARE

## 2023-12-26 VITALS
RESPIRATION RATE: 18 BRPM | HEIGHT: 60 IN | WEIGHT: 204 LBS | BODY MASS INDEX: 40.05 KG/M2 | DIASTOLIC BLOOD PRESSURE: 78 MMHG | HEART RATE: 84 BPM | OXYGEN SATURATION: 98 % | TEMPERATURE: 97.6 F | SYSTOLIC BLOOD PRESSURE: 130 MMHG

## 2023-12-26 DIAGNOSIS — E66.01 CLASS 2 SEVERE OBESITY DUE TO EXCESS CALORIES WITH SERIOUS COMORBIDITY AND BODY MASS INDEX (BMI) OF 39.0 TO 39.9 IN ADULT (MULTI): ICD-10-CM

## 2023-12-26 DIAGNOSIS — M47.16 LUMBAR SPONDYLOSIS WITH MYELOPATHY: Primary | ICD-10-CM

## 2023-12-26 PROCEDURE — 3008F BODY MASS INDEX DOCD: CPT | Performed by: FAMILY MEDICINE

## 2023-12-26 PROCEDURE — 3078F DIAST BP <80 MM HG: CPT | Performed by: FAMILY MEDICINE

## 2023-12-26 PROCEDURE — 99213 OFFICE O/P EST LOW 20 MIN: CPT | Performed by: FAMILY MEDICINE

## 2023-12-26 PROCEDURE — 1159F MED LIST DOCD IN RCRD: CPT | Performed by: FAMILY MEDICINE

## 2023-12-26 PROCEDURE — 1036F TOBACCO NON-USER: CPT | Performed by: FAMILY MEDICINE

## 2023-12-26 PROCEDURE — 3075F SYST BP GE 130 - 139MM HG: CPT | Performed by: FAMILY MEDICINE

## 2023-12-26 PROCEDURE — 1160F RVW MEDS BY RX/DR IN RCRD: CPT | Performed by: FAMILY MEDICINE

## 2023-12-26 RX ORDER — METHYLPREDNISOLONE 4 MG/1
TABLET ORAL
Qty: 21 TABLET | Refills: 0 | Status: SHIPPED | OUTPATIENT
Start: 2023-12-26 | End: 2024-01-02

## 2023-12-26 RX ORDER — HYDROCODONE BITARTRATE AND ACETAMINOPHEN 5; 325 MG/1; MG/1
1 TABLET ORAL EVERY 6 HOURS PRN
Qty: 20 TABLET | Refills: 0 | Status: SHIPPED | OUTPATIENT
Start: 2023-12-26 | End: 2023-12-31

## 2023-12-26 RX ORDER — TIZANIDINE 4 MG/1
4 TABLET ORAL NIGHTLY PRN
Qty: 30 TABLET | Refills: 0 | Status: SHIPPED | OUTPATIENT
Start: 2023-12-26 | End: 2024-02-26 | Stop reason: SDUPTHER

## 2023-12-26 RX ORDER — MELOXICAM 15 MG/1
15 TABLET ORAL DAILY
Qty: 30 TABLET | Refills: 0 | Status: SHIPPED | OUTPATIENT
Start: 2023-12-26 | End: 2024-05-07 | Stop reason: WASHOUT

## 2023-12-26 ASSESSMENT — ENCOUNTER SYMPTOMS
TINGLING: 0
ARTHRALGIAS: 1
ABDOMINAL PAIN: 0
WEAKNESS: 0
HEADACHES: 0
HEMATURIA: 0
VOMITING: 0
DIARRHEA: 0
LEG PAIN: 1
DYSURIA: 0
CONSTIPATION: 0
FEVER: 0
PALPITATIONS: 0
BACK PAIN: 1
SORE THROAT: 0
WHEEZING: 0
RHINORRHEA: 0
NUMBNESS: 0
FREQUENCY: 0
COUGH: 0
CHILLS: 0
DIZZINESS: 0
TREMORS: 0
SHORTNESS OF BREATH: 0

## 2023-12-26 NOTE — TELEPHONE ENCOUNTER
Ragini called central scheduling and they told her she couldn't see dr. Gonzalez until jan 18th but she said she can't wait that long. She has arthritis in her back and the last three days its been unbearable she hasn't been able to sleep at all and she said she can barely stand up and walk and wanted to know if dr. Gonzalez would be willing to see her sooner anytime. Please advise

## 2023-12-26 NOTE — PROGRESS NOTES
Chief Complaint   Patient presents with    Back Pain       Subjective   Patient ID: Ragini Santos is a 68 y.o. female who presents for Back Pain.    Back Pain  This is a recurrent problem. The current episode started in the past 7 days. The problem occurs constantly. The problem has been gradually worsening since onset. The pain is present in the lumbar spine. The quality of the pain is described as stabbing, shooting, aching and burning. The pain radiates to the left thigh and right thigh (down buttock and into both legs). The pain is severe. The pain is The same all the time. The symptoms are aggravated by bending, lying down, sitting, stress, coughing, standing, twisting and position. Associated symptoms include leg pain. Pertinent negatives include no abdominal pain, chest pain, dysuria, fever, headaches, numbness, tingling or weakness.        Review of Systems   Constitutional:  Negative for chills and fever.   HENT:  Negative for congestion, ear pain, nosebleeds, rhinorrhea and sore throat.    Respiratory:  Negative for cough, shortness of breath and wheezing.    Cardiovascular:  Negative for chest pain, palpitations and leg swelling.   Gastrointestinal:  Negative for abdominal pain, constipation, diarrhea and vomiting.   Genitourinary:  Negative for dysuria, frequency and hematuria.   Musculoskeletal:  Positive for arthralgias and back pain.   Neurological:  Negative for dizziness, tingling, tremors, weakness, numbness and headaches.       Objective   /78 (BP Location: Left arm)   Pulse 84   Temp 36.4 °C (97.6 °F)   Resp 18   Ht 1.524 m (5')   Wt 92.5 kg (204 lb)   SpO2 98%   BMI 39.84 kg/m²     Physical Exam  Constitutional:       General: She is not in acute distress.     Appearance: Normal appearance.   HENT:      Head: Normocephalic and atraumatic.      Mouth/Throat:      Mouth: Mucous membranes are moist.      Pharynx: Oropharynx is clear. No oropharyngeal exudate or posterior  oropharyngeal erythema.   Eyes:      General: No scleral icterus.     Extraocular Movements: Extraocular movements intact.      Pupils: Pupils are equal, round, and reactive to light.   Cardiovascular:      Rate and Rhythm: Normal rate and regular rhythm.      Pulses: Normal pulses.      Heart sounds: No murmur heard.     No friction rub. No gallop.   Pulmonary:      Effort: Pulmonary effort is normal.      Breath sounds: No wheezing, rhonchi or rales.   Musculoskeletal:      Lumbar back: Spasms, tenderness and bony tenderness present. No swelling or edema. Decreased range of motion. Negative right straight leg raise test and negative left straight leg raise test.   Skin:     General: Skin is warm.      Coloration: Skin is not jaundiced or pale.   Neurological:      General: No focal deficit present.      Mental Status: She is alert and oriented to person, place, and time.     STUDY:  MRI L-SPINE WO;  11/21/2022 8:43 am     INDICATION:  lumbar spondylosis with myelopathy, lumbar spinal stenosis and  bilateral leg weakness  M48.061: Lumbar spinal stenosis M47.16:  Lumbar spondylosis with myelopathy R29.898: Weakness of both lower  extremities.     COMPARISON:  Lumbar spine radiographs dated 10/21/2022.     ACCESSION NUMBER(S):  [id number]     ORDERING CLINICIAN:  FARRAH RYAN     TECHNIQUE:  Multiplanar multisequence MR imaging of the lumbar spine performed  without intravenous contrast. Sagittal T1, T2, STIR, axial T1 and T2  weighted images of the lumbar spine were acquired.     FINDINGS:  Segmentation: 5 lumbar vertebral bodies are designated on this  examination.     Conus: The lower thoracic cord appears unremarkable. The conus  terminates at the level of the inferior endplate of L1. Cauda equina  are unremarkable.  Epidural fluid: None.     Alignment: 2 mm anterolisthesis of L4 on L5. Equivocal  anterolisthesis of L5 on S1. Alignment is otherwise grossly  maintained.  Vertebral bodies: Vertebral body heights are  maintained.  Marrow signal: Minimal degenerative endplate marrow signal change. No  substantial STIR hyperintensity/edema.  Intervertebral discs: Mild multilevel disc desiccation. Mild  degenerative disc height loss at L3-L4.     Degenerative change:     T12-L1: Unremarkable.  L1-2: Unremarkable.  L2-3: Minimal/mild facet arthropathy. Mild disc bulge and endplate  spurring. No spinal canal stenosis. No substantial neural foraminal  narrowing  L3-4: Mild facet arthropathy. Mild disc bulge with hypertrophic  endplate spurring and trace posterior fissuring. There is slight  indentation of the ventral thecal sac without spinal canal stenosis.  No significant lateral recess stenosis. No neural foraminal narrowing.  L4-5: Moderate facet arthropathy with ligamentum flavum thickening.  Mild disc uncovering with mild endplate spurring. No spinal canal  stenosis. Minimal neural foraminal narrowing.  L5-S1: Severe bilateral facet arthropathy. Minimal disc  bulge/uncovering. No spinal canal stenosis. No significant right and  mild left neural foraminal narrowing secondary to facet arthropathy.     Soft tissues: The prevertebral and posterior paraspinal soft tissues  are unremarkable.     IMPRESSION:  Advanced lower lumbar facet arthropathy with trace anterolisthesis of  L4 on L5 and L5 on S1. No spinal canal stenosis. Mild left neural  foraminal narrowing at L5-S1.    Assessment/Plan   Problem List Items Addressed This Visit       Class 2 severe obesity due to excess calories with serious comorbidity and body mass index (BMI) of 39.0 to 39.9 in adult (CMS/[facility])     Continue decrease calorie diet and not more than 1500 calorie per day diet and low-fat diet.  Continue with regular exercise program.  We advised exercise at least 5 days a week for at least 45 minutes and also a minimum of 10,000 steps a day.  The detrimental effects of obesity on health were discussed.         Lumbar spondylosis with myelopathy - Primary     We will  start her on Medrol Dose Pack, Meloxicam and Tizanidine and refer her to Pain Management for further evaluation.    Follow-up if persistent or worsening symptoms otherwise when necessary.         Relevant Medications    methylPREDNISolone (Medrol Dospak) 4 mg tablets    meloxicam (Mobic) 15 mg tablet    tiZANidine (Zanaflex) 4 mg tablet    HYDROcodone-acetaminophen (Norco) 5-325 mg tablet    Other Relevant Orders    Referral to Pain Medicine     For acute pain, rest, intermittent application of heat (do not sleep on heating pad), analgesics and muscle relaxants are recommended. Discussed longer term treatment plan including home back care exercise program with flexion exercise routine. Proper lifting with avoidance of heavy lifting discussed. Consider Physical Therapy and XRay studies if not improving. Call or return to clinic prn if these symptoms worsen or fail to improve as anticipated.  Follow up if persistent or worsening symptoms otherwise prn.   Scribe Attestation  By signing my name below, IZana Scribe   attest that this documentation has been prepared under the direction and in the presence of Michael Gonzalez MD.

## 2023-12-26 NOTE — PATIENT INSTRUCTIONS
BMI was above normal measurement. Current weight: 92.5 kg (204 lb)  Weight change since last visit (-) denotes wt loss 4 lbs   Weight loss needed to achieve BMI 25: 76.3 Lbs  Weight loss needed to achieve BMI 30: 50.7 Lbs  Advised to Increase physical activity.

## 2023-12-26 NOTE — ASSESSMENT & PLAN NOTE
We will start her on Medrol Dose Pack, Meloxicam and Tizanidine and refer her to Pain Management for further evaluation.    Follow-up if persistent or worsening symptoms otherwise when necessary.

## 2024-02-01 ENCOUNTER — INITIAL CONSULT (OUTPATIENT)
Dept: PAIN MANAGEMENT | Age: 69
End: 2024-02-01
Payer: MEDICARE

## 2024-02-01 VITALS
DIASTOLIC BLOOD PRESSURE: 68 MMHG | BODY MASS INDEX: 39.27 KG/M2 | TEMPERATURE: 97.6 F | HEIGHT: 60 IN | SYSTOLIC BLOOD PRESSURE: 132 MMHG | WEIGHT: 200 LBS

## 2024-02-01 DIAGNOSIS — M47.817 LUMBOSACRAL SPONDYLOSIS WITHOUT MYELOPATHY: Primary | ICD-10-CM

## 2024-02-01 DIAGNOSIS — G89.4 CHRONIC PAIN SYNDROME: ICD-10-CM

## 2024-02-01 DIAGNOSIS — M16.11 ARTHRITIS OF RIGHT HIP: ICD-10-CM

## 2024-02-01 PROCEDURE — 99204 OFFICE O/P NEW MOD 45 MIN: CPT | Performed by: PAIN MEDICINE

## 2024-02-01 PROCEDURE — 1036F TOBACCO NON-USER: CPT | Performed by: PAIN MEDICINE

## 2024-02-01 PROCEDURE — 1090F PRES/ABSN URINE INCON ASSESS: CPT | Performed by: PAIN MEDICINE

## 2024-02-01 PROCEDURE — 3017F COLORECTAL CA SCREEN DOC REV: CPT | Performed by: PAIN MEDICINE

## 2024-02-01 PROCEDURE — G8427 DOCREV CUR MEDS BY ELIG CLIN: HCPCS | Performed by: PAIN MEDICINE

## 2024-02-01 PROCEDURE — G8484 FLU IMMUNIZE NO ADMIN: HCPCS | Performed by: PAIN MEDICINE

## 2024-02-01 PROCEDURE — G8417 CALC BMI ABV UP PARAM F/U: HCPCS | Performed by: PAIN MEDICINE

## 2024-02-01 PROCEDURE — 3078F DIAST BP <80 MM HG: CPT | Performed by: PAIN MEDICINE

## 2024-02-01 PROCEDURE — 1123F ACP DISCUSS/DSCN MKR DOCD: CPT | Performed by: PAIN MEDICINE

## 2024-02-01 PROCEDURE — G8399 PT W/DXA RESULTS DOCUMENT: HCPCS | Performed by: PAIN MEDICINE

## 2024-02-01 PROCEDURE — 3075F SYST BP GE 130 - 139MM HG: CPT | Performed by: PAIN MEDICINE

## 2024-02-01 RX ORDER — TRAMADOL HYDROCHLORIDE 50 MG/1
50 TABLET ORAL 2 TIMES DAILY PRN
Qty: 60 TABLET | Refills: 0 | Status: SHIPPED | OUTPATIENT
Start: 2024-02-01 | End: 2024-03-02

## 2024-02-01 NOTE — PROGRESS NOTES
remote memory is intact,mood and affect normal,judgement and insight normal. Head normacephalic,eyes - PERRLA, EOMI, No obvious external Ears, Nose, mouth masses seen, neck supple, trachae midline, no abnormal lymph nodes palpated in neck or supraclavicular region. CN's 2-12 grossly intact. Strength functional for ambulation, balance functional, coordination normal. Visualized skin intact, no obvious lesion or visualized cyanosis. No swelling. Pulses intact. No obvious upper motor neuron signs. Sensation grossly intact to light touch. Strength functional upper extremities.SLR negative.   Tender along R lumber facet joints with pain and decreased ROM.  Extension and rotation with muscle spasms.  Some pain with right hip internal/external rotation.        Assessment:      Diagnosis Orders   1. Lumbosacral spondylosis without myelopathy  Urine Drug Screen    [place] NJX DX/THER AGT PVRT FACET JT LMBR/SAC 1 LEVEL    [place] NJX DX/THER AGT PVRT FACET JT LMBR/SAC 2ND LEVEL      2. Arthritis of right hip  XR HIP RIGHT (2-3 VIEWS)      3. Chronic pain syndrome            Plan:

## 2024-02-06 ENCOUNTER — PROCEDURE VISIT (OUTPATIENT)
Dept: PAIN MANAGEMENT | Age: 69
End: 2024-02-06
Payer: MEDICARE

## 2024-02-06 DIAGNOSIS — M47.817 LUMBOSACRAL SPONDYLOSIS WITHOUT MYELOPATHY: ICD-10-CM

## 2024-02-06 PROCEDURE — 64493 INJ PARAVERT F JNT L/S 1 LEV: CPT | Performed by: PAIN MEDICINE

## 2024-02-06 PROCEDURE — 64494 INJ PARAVERT F JNT L/S 2 LEV: CPT | Performed by: PAIN MEDICINE

## 2024-02-06 RX ORDER — BETAMETHASONE SODIUM PHOSPHATE AND BETAMETHASONE ACETATE 3; 3 MG/ML; MG/ML
6 INJECTION, SUSPENSION INTRA-ARTICULAR; INTRALESIONAL; INTRAMUSCULAR; SOFT TISSUE ONCE
Status: COMPLETED | OUTPATIENT
Start: 2024-02-06 | End: 2024-02-06

## 2024-02-06 RX ORDER — LIDOCAINE HYDROCHLORIDE 10 MG/ML
3 INJECTION, SOLUTION EPIDURAL; INFILTRATION; INTRACAUDAL; PERINEURAL ONCE
Status: COMPLETED | OUTPATIENT
Start: 2024-02-06 | End: 2024-02-06

## 2024-02-06 RX ADMIN — LIDOCAINE HYDROCHLORIDE 3 ML: 10 INJECTION, SOLUTION EPIDURAL; INFILTRATION; INTRACAUDAL; PERINEURAL at 09:51

## 2024-02-06 RX ADMIN — BETAMETHASONE SODIUM PHOSPHATE AND BETAMETHASONE ACETATE 6 MG: 3; 3 INJECTION, SUSPENSION INTRA-ARTICULAR; INTRALESIONAL; INTRAMUSCULAR; SOFT TISSUE at 09:51

## 2024-02-06 NOTE — PROGRESS NOTES
[facility]  [address], [address]  [place], [place]  P: [phone]  F: [phone]      LUMBAR MEDIAL BRANCH BLOCK      Provider: LOGAN WILLIAM DO          Patient Name: Viki Jerome : 1955        Date: 2024       Viki Jerome is here today for interventional pain management.  Standard ASI guidelines were followed and sterile technique used.  Area was cleaned with Betadine x3.  Informed consent was obtained.  Fluoroscopic guidance was used for this procedure. Junction of superior articular process and transverse process was identified. For L5 dorsal primary ramus groove of sacral ala and SAP of S1 was identified. Appropriate length spinal needle was used and advanced to correct anatomic location. Negative aspiration was achieved.  At each site approximately 1/2cc of 1% preservative free Lidocaine was injected without difficulty.  6 mg Celestone added    Patient tolerated the procedure well, no obvious complications occurred during the procedure.  Patient was appropriately monitored and discharged home in stable condition with their usual motor strength. The patient will keep close track of pain over the next several hours and call our office tomorrow and let us know what percentage of pain relief is experienced.  Post op Instructions were given to patient. Relevant and recent imaging reviewed with patient today.                  [] Bilateral [] T12 [] S1      [] L1 [] S2     [x] Right [] L2 [] S3      [x] L3      [] Left [x] L4         [x] L5 [] S.IMario WILLIAM DO

## 2024-02-16 ENCOUNTER — OFFICE VISIT (OUTPATIENT)
Dept: PRIMARY CARE | Facility: CLINIC | Age: 69
End: 2024-02-16
Payer: MEDICARE

## 2024-02-16 VITALS
TEMPERATURE: 97.8 F | WEIGHT: 203 LBS | DIASTOLIC BLOOD PRESSURE: 70 MMHG | SYSTOLIC BLOOD PRESSURE: 124 MMHG | HEIGHT: 60 IN | OXYGEN SATURATION: 99 % | HEART RATE: 62 BPM | RESPIRATION RATE: 17 BRPM | BODY MASS INDEX: 39.85 KG/M2

## 2024-02-16 DIAGNOSIS — F33.1 MODERATE EPISODE OF RECURRENT MAJOR DEPRESSIVE DISORDER (MULTI): ICD-10-CM

## 2024-02-16 DIAGNOSIS — K21.9 GASTRO-ESOPHAGEAL REFLUX DISEASE WITHOUT ESOPHAGITIS: ICD-10-CM

## 2024-02-16 DIAGNOSIS — E66.01 CLASS 2 SEVERE OBESITY DUE TO EXCESS CALORIES WITH SERIOUS COMORBIDITY AND BODY MASS INDEX (BMI) OF 39.0 TO 39.9 IN ADULT (MULTI): ICD-10-CM

## 2024-02-16 DIAGNOSIS — I10 ESSENTIAL HYPERTENSION: Primary | ICD-10-CM

## 2024-02-16 DIAGNOSIS — E78.2 MIXED HYPERLIPIDEMIA: ICD-10-CM

## 2024-02-16 DIAGNOSIS — F41.1 GENERALIZED ANXIETY DISORDER: ICD-10-CM

## 2024-02-16 PROCEDURE — 3074F SYST BP LT 130 MM HG: CPT | Performed by: FAMILY MEDICINE

## 2024-02-16 PROCEDURE — 1159F MED LIST DOCD IN RCRD: CPT | Performed by: FAMILY MEDICINE

## 2024-02-16 PROCEDURE — 1123F ACP DISCUSS/DSCN MKR DOCD: CPT | Performed by: FAMILY MEDICINE

## 2024-02-16 PROCEDURE — 1158F ADVNC CARE PLAN TLK DOCD: CPT | Performed by: FAMILY MEDICINE

## 2024-02-16 PROCEDURE — 3008F BODY MASS INDEX DOCD: CPT | Performed by: FAMILY MEDICINE

## 2024-02-16 PROCEDURE — 1036F TOBACCO NON-USER: CPT | Performed by: FAMILY MEDICINE

## 2024-02-16 PROCEDURE — 99214 OFFICE O/P EST MOD 30 MIN: CPT | Performed by: FAMILY MEDICINE

## 2024-02-16 PROCEDURE — 1160F RVW MEDS BY RX/DR IN RCRD: CPT | Performed by: FAMILY MEDICINE

## 2024-02-16 PROCEDURE — 3078F DIAST BP <80 MM HG: CPT | Performed by: FAMILY MEDICINE

## 2024-02-16 RX ORDER — LISINOPRIL AND HYDROCHLOROTHIAZIDE 12.5; 2 MG/1; MG/1
1 TABLET ORAL 2 TIMES DAILY
Qty: 60 TABLET | Refills: 3 | Status: SHIPPED | OUTPATIENT
Start: 2024-02-16 | End: 2024-05-07 | Stop reason: SDUPTHER

## 2024-02-16 ASSESSMENT — PATIENT HEALTH QUESTIONNAIRE - PHQ9
SUM OF ALL RESPONSES TO PHQ9 QUESTIONS 1 AND 2: 0
2. FEELING DOWN, DEPRESSED OR HOPELESS: NOT AT ALL
1. LITTLE INTEREST OR PLEASURE IN DOING THINGS: NOT AT ALL

## 2024-02-16 NOTE — PROGRESS NOTES
Chief Complaint   Patient presents with    Follow-up     Bruising on legs     Hypertension       Subjective   Patient ID: Ragini Santos is a 68 y.o. female who presents for Follow-up (Bruising on legs ) and Hypertension.    Skin Discoloration  She is on medications as noted and overall the discoloration has improved.  She complains of bruising in the thigh which has resolved.    Hypertension  Patient is here for follow-up on hypertension. She is exercising and is adherent to a low-salt diet. Blood pressure is well controlled at home. Cardiac symptoms: none. Patient denies chest pain, dyspnea, exertional chest pressure/discomfort, near-syncope, orthopnea, palpitations, paroxysmal nocturnal dyspnea, and syncope.   Use of agents associated with hypertension: none. History of target organ damage: none.        Review of Systems   Constitutional:  Negative for chills and fever.   HENT:  Negative for congestion, ear pain, nosebleeds, rhinorrhea and sore throat.    Respiratory:  Negative for cough, shortness of breath and wheezing.    Cardiovascular:  Negative for chest pain, palpitations and leg swelling.   Gastrointestinal:  Negative for abdominal pain, constipation, diarrhea and vomiting.   Genitourinary:  Negative for dysuria, frequency and hematuria.   Neurological:  Negative for dizziness, tremors, numbness and headaches.     Objective   /70 (BP Location: Right arm, Patient Position: Sitting)   Pulse 62   Temp 36.6 °C (97.8 °F)   Resp 17   Ht 1.524 m (5')   Wt 92.1 kg (203 lb)   SpO2 99%   BMI 39.65 kg/m²     Physical Exam  Constitutional:       General: She is not in acute distress.     Appearance: Normal appearance.   HENT:      Head: Normocephalic and atraumatic.      Mouth/Throat:      Mouth: Mucous membranes are moist.      Pharynx: Oropharynx is clear. No oropharyngeal exudate or posterior oropharyngeal erythema.   Eyes:      General: No scleral icterus.     Extraocular Movements: Extraocular  movements intact.      Pupils: Pupils are equal, round, and reactive to light.   Cardiovascular:      Rate and Rhythm: Normal rate and regular rhythm.      Pulses: Normal pulses.      Heart sounds: No murmur heard.     No friction rub. No gallop.   Pulmonary:      Effort: Pulmonary effort is normal.      Breath sounds: No wheezing, rhonchi or rales.   Skin:     General: Skin is warm.      Coloration: Skin is not jaundiced or pale.      Findings: No erythema or rash.   Neurological:      General: No focal deficit present.      Mental Status: She is alert and oriented to person, place, and time.      Cranial Nerves: No cranial nerve deficit.      Sensory: No sensory deficit.      Coordination: Coordination normal.      Gait: Gait normal.         Assessment/Plan   Problem List Items Addressed This Visit       Generalized anxiety disorder     The risks and benefits of my recommendations, as well as other treatment options were discussed with the patient today.    The side effects of the medications were discussed.  Advised not to take the medication with alcohol .  Exercise regularly and this can give you a sense of well being and help decrease feelings of anxiety.;  Get plenty of sleep. Sleep rests your brain as well as your body, and can improve your general sense of wellbeing as well as your mood.;  Avoid alcohol and drug abuse. It may seem that alcohol or drugs relax you. But in the long run they make anxiety worse and cause more problems.;  Avoid caffeine. Caffeine is found in coffee, tea, soft drinks and chocolate. Caffeine may increase your sense of anxiety because it stimulates your nervous system. Also avoid over-the-counter diet pills, and cough and cold medicines that contain a decongestant.;  Confront the things that have made you anxious in the past. Begin by just picturing yourself confronting these things. By doing this, you can get used to the idea of confronting the things that make you anxious before  you actually do it. After you feel more comfortable picturing yourself confronting these things, you can begin to actually face them.;  If you feel yourself getting anxious, practice a relaxation technique or focus on a simple task, such as counting backward from 100 to 0.;  Although feelings of anxiety are scary, they won't hurt you. Label the level of your fear from 0 to 10 and keep track as it goes up and down. Notice that it doesn't stay at a very high level for more than a few seconds. When the fear comes, accept it. Wait and give it time to pass without running away from it.;  Take medications as prescribed.          Essential hypertension - Primary    Relevant Medications    lisinopriL-hydrochlorothiazide 20-12.5 mg tablet    Gastro-esophageal reflux disease without esophagitis     Well-controlled, continue current medications and management.          Mixed hyperlipidemia     Recommend low-cholesterol diet, low-fat diet and low-salt diet.  The need for lifelong dietary compliance in order to reduce cardiac risk is recommended.  We will also recommend regular exercise program to improve lipid balance and overall health.  Recommend decreasing fat and cholesterol in diet, increasing aerobic exercise with a goal of 4 or more days per week          Moderate episode of recurrent major depressive disorder (CMS/[facility])     Chronic Condition Documentation : Stable based on symptoms and exam.  Continue established treatment plan and follow-up at least yearly.          Class 2 severe obesity due to excess calories with serious comorbidity and body mass index (BMI) of 39.0 to 39.9 in adult (CMS/HCC)     Continue decrease calorie diet and not more than 1500 calorie per day diet and low-fat diet.  Continue with regular exercise program.  We advised exercise at least 5 days a week for at least 45 minutes and also a minimum of 10,000 steps a day.  The detrimental effects of obesity on health were discussed.           Scribe  Attestation  By signing my name below, I, Lang Lujan   attest that this documentation has been prepared under the direction and in the presence of Michael Gonzalez MD.

## 2024-02-17 PROBLEM — I80.232: Status: RESOLVED | Noted: 2023-05-18 | Resolved: 2024-02-17

## 2024-02-17 ASSESSMENT — ENCOUNTER SYMPTOMS
PALPITATIONS: 0
VOMITING: 0
DIZZINESS: 0
DIARRHEA: 0
CHILLS: 0
RHINORRHEA: 0
SORE THROAT: 0
FEVER: 0
HEMATURIA: 0
WHEEZING: 0
NUMBNESS: 0
FREQUENCY: 0
COUGH: 0
ABDOMINAL PAIN: 0
SHORTNESS OF BREATH: 0
HEADACHES: 0
TREMORS: 0
DYSURIA: 0
CONSTIPATION: 0

## 2024-02-20 ENCOUNTER — OFFICE VISIT (OUTPATIENT)
Dept: PAIN MANAGEMENT | Age: 69
End: 2024-02-20
Payer: MEDICARE

## 2024-02-20 VITALS
HEIGHT: 60 IN | DIASTOLIC BLOOD PRESSURE: 72 MMHG | SYSTOLIC BLOOD PRESSURE: 136 MMHG | BODY MASS INDEX: 39.27 KG/M2 | HEART RATE: 58 BPM | WEIGHT: 200 LBS

## 2024-02-20 DIAGNOSIS — M25.551 PAIN OF RIGHT HIP: ICD-10-CM

## 2024-02-20 DIAGNOSIS — G89.4 CHRONIC PAIN SYNDROME: ICD-10-CM

## 2024-02-20 DIAGNOSIS — M47.817 LUMBOSACRAL SPONDYLOSIS WITHOUT MYELOPATHY: Primary | ICD-10-CM

## 2024-02-20 PROCEDURE — G8427 DOCREV CUR MEDS BY ELIG CLIN: HCPCS | Performed by: NURSE PRACTITIONER

## 2024-02-20 PROCEDURE — 99214 OFFICE O/P EST MOD 30 MIN: CPT | Performed by: NURSE PRACTITIONER

## 2024-02-20 PROCEDURE — 1036F TOBACCO NON-USER: CPT | Performed by: NURSE PRACTITIONER

## 2024-02-20 PROCEDURE — G8399 PT W/DXA RESULTS DOCUMENT: HCPCS | Performed by: NURSE PRACTITIONER

## 2024-02-20 PROCEDURE — G8484 FLU IMMUNIZE NO ADMIN: HCPCS | Performed by: NURSE PRACTITIONER

## 2024-02-20 PROCEDURE — 1090F PRES/ABSN URINE INCON ASSESS: CPT | Performed by: NURSE PRACTITIONER

## 2024-02-20 PROCEDURE — 3075F SYST BP GE 130 - 139MM HG: CPT | Performed by: NURSE PRACTITIONER

## 2024-02-20 PROCEDURE — 1123F ACP DISCUSS/DSCN MKR DOCD: CPT | Performed by: NURSE PRACTITIONER

## 2024-02-20 PROCEDURE — G8417 CALC BMI ABV UP PARAM F/U: HCPCS | Performed by: NURSE PRACTITIONER

## 2024-02-20 PROCEDURE — 3078F DIAST BP <80 MM HG: CPT | Performed by: NURSE PRACTITIONER

## 2024-02-20 PROCEDURE — 3017F COLORECTAL CA SCREEN DOC REV: CPT | Performed by: NURSE PRACTITIONER

## 2024-02-20 RX ORDER — TRAMADOL HYDROCHLORIDE 50 MG/1
50 TABLET ORAL 2 TIMES DAILY PRN
Qty: 60 TABLET | Refills: 0 | Status: SHIPPED | OUTPATIENT
Start: 2024-03-02 | End: 2024-04-01

## 2024-02-20 RX ORDER — NALOXONE HYDROCHLORIDE 4 MG/.1ML
1 SPRAY NASAL PRN
Qty: 2 EACH | Refills: 1 | Status: SHIPPED | OUTPATIENT
Start: 2024-02-20

## 2024-02-20 ASSESSMENT — ENCOUNTER SYMPTOMS
BACK PAIN: 1
SHORTNESS OF BREATH: 0
COUGH: 0
TROUBLE SWALLOWING: 0
CONSTIPATION: 0
EYES NEGATIVE: 1
GASTROINTESTINAL NEGATIVE: 1
DIARRHEA: 0

## 2024-02-20 NOTE — PROGRESS NOTES
Viki Jerome  (1955)    2024    Subjective:     Viki Jerome is 68 y.o. female who complains today of:    Chief Complaint   Patient presents with    Back Pain     Lower right          Allergies:  Patient has no known allergies.    Past Medical History:   Diagnosis Date    Anxiety     Cancer (HCC)     skin cancer    Depression     Diverticulitis     Hyperlipidemia     Hypertension     Obesity     Precordial pain 10/9/2018    Urinary incontinence      Past Surgical History:   Procedure Laterality Date    COLONOSCOPY      HYSTERECTOMY (CERVIX STATUS UNKNOWN)      SKIN CANCER EXCISION      forehead    TUBAL LIGATION  1976    UPPER GASTROINTESTINAL ENDOSCOPY  2016    w/bx      Family History   Problem Relation Age of Onset    Stroke Mother     COPD Mother     High Blood Pressure Father     Heart Disease Father     High Blood Pressure Sister     Kidney Disease Sister     High Blood Pressure Brother     High Blood Pressure Sister     High Blood Pressure Sister      Social History     Socioeconomic History    Marital status:      Spouse name: Not on file    Number of children: Not on file    Years of education: Not on file    Highest education level: Not on file   Occupational History    Not on file   Tobacco Use    Smoking status: Former     Current packs/day: 0.00     Types: Cigarettes     Start date: 1971     Quit date: 10/25/1971     Years since quittin.3    Smokeless tobacco: Never    Tobacco comments:     extremely light smoker only for a short time   Substance and Sexual Activity    Alcohol use: No    Drug use: No    Sexual activity: Not on file   Other Topics Concern    Not on file   Social History Narrative    Not on file     Social Determinants of Health     Financial Resource Strain: Not on file   Food Insecurity: Not on file   Transportation Needs: Not on file   Physical Activity: Not on file   Stress: Not on file   Social Connections: Not

## 2024-02-21 DIAGNOSIS — E87.6 HYPOKALEMIA: ICD-10-CM

## 2024-02-21 RX ORDER — POTASSIUM CHLORIDE 750 MG/1
10 TABLET, EXTENDED RELEASE ORAL 2 TIMES DAILY
Qty: 60 TABLET | Refills: 2 | Status: SHIPPED | OUTPATIENT
Start: 2024-02-21 | End: 2024-05-07 | Stop reason: SDUPTHER

## 2024-02-21 NOTE — TELEPHONE ENCOUNTER
Recent Visits  Date Type Provider Dept   02/16/24 Office Visit Michael Gonzalez MD Do [id number] [facility]   12/26/23 Office Visit Michael Gonzalez MD Do [id number] [facility]   12/14/23 Office Visit Michael Gonzalez MD Do [id number] [facility]   11/24/23 Clinical Support TRICITY FM ELYR15 PC1 MA 1 Do [id number] [facility]   11/02/23 Office Visit Michael Gonzalez MD Do [id number] [facility]   10/25/23 Clinical Support TRICITY FM ELYR15 PC1 MA 1 Do [id number] [facility]   10/18/23 Office Visit Michael Gonzalez MD Do [id number] [facility]   05/18/23 Office Visit Michael Gonzalez MD Do [id number] [facility]   04/05/23 Office Visit Michael Gonzalez MD Do [id number] [facility]   Showing recent visits within past 540 days and meeting all other requirements  Future Appointments  Date Type Provider Dept   04/12/24 Appointment Michael Gonzalez MD Do [id number] [facility]   Showing future appointments within next 180 days and meeting all other requirements

## 2024-02-22 ENCOUNTER — TELEPHONE (OUTPATIENT)
Dept: PAIN MANAGEMENT | Age: 69
End: 2024-02-22

## 2024-02-22 NOTE — TELEPHONE ENCOUNTER
SECOND RIGHT L345 MBB    NO AUTH REQUIRED    OK to schedule procedure approved as above.   Please note sides/levels approved and date range.   (If applicable, sides/levels approved may differ from those ordered)    TO BE SCHEDULED WITH DR WILLIAM

## 2024-02-26 DIAGNOSIS — M47.16 LUMBAR SPONDYLOSIS WITH MYELOPATHY: ICD-10-CM

## 2024-02-26 RX ORDER — TIZANIDINE 4 MG/1
4 TABLET ORAL NIGHTLY PRN
Qty: 30 TABLET | Refills: 0 | Status: SHIPPED | OUTPATIENT
Start: 2024-02-26 | End: 2024-04-12

## 2024-02-26 RX ORDER — TIZANIDINE 4 MG/1
4 TABLET ORAL NIGHTLY PRN
Qty: 30 TABLET | Refills: 0 | Status: SHIPPED | OUTPATIENT
Start: 2024-02-26 | End: 2024-02-26 | Stop reason: SDUPTHER

## 2024-02-26 NOTE — TELEPHONE ENCOUNTER
Rx Refill Request     Name: Ragini Santos  :  1955   Medication Name:  Giant eagle   Specific Pharmacy location:  Tizanidine 4 mg  Date of last appointment:  2024   Date of next appointment:  2024   Best number to reach patient:  [phone]

## 2024-03-05 ENCOUNTER — PROCEDURE VISIT (OUTPATIENT)
Dept: PAIN MANAGEMENT | Age: 69
End: 2024-03-05
Payer: MEDICARE

## 2024-03-05 DIAGNOSIS — M47.817 LUMBOSACRAL SPONDYLOSIS WITHOUT MYELOPATHY: ICD-10-CM

## 2024-03-05 PROCEDURE — 64494 INJ PARAVERT F JNT L/S 2 LEV: CPT | Performed by: PAIN MEDICINE

## 2024-03-05 PROCEDURE — 64493 INJ PARAVERT F JNT L/S 1 LEV: CPT | Performed by: PAIN MEDICINE

## 2024-03-05 RX ORDER — LIDOCAINE HYDROCHLORIDE 10 MG/ML
3 INJECTION, SOLUTION EPIDURAL; INFILTRATION; INTRACAUDAL; PERINEURAL ONCE
Status: COMPLETED | OUTPATIENT
Start: 2024-03-05 | End: 2024-03-05

## 2024-03-05 RX ADMIN — LIDOCAINE HYDROCHLORIDE 3 ML: 10 INJECTION, SOLUTION EPIDURAL; INFILTRATION; INTRACAUDAL; PERINEURAL at 09:55

## 2024-03-05 NOTE — PROGRESS NOTES
[facility]  [facility]  [address], [address]  [place], [place]  P: [phone]  F: [phone]      LUMBAR MEDIAL BRANCH BLOCK      Provider: LOGAN WILLIAM DO          Patient Name: Viki Jerome : 1955        Date: 3/5/2024       Viki Jerome is here today for interventional pain management.  Standard ASI guidelines were followed and sterile technique used.  Area was cleaned with Betadine x3.  Informed consent was obtained.  Fluoroscopic guidance was used for this procedure. Junction of superior articular process and transverse process was identified. For L5 dorsal primary ramus groove of sacral ala and SAP of S1 was identified. Appropriate length spinal needle was used and advanced to correct anatomic location. Negative aspiration was achieved.  At each site approximately 1/2cc of 1% preservative free Lidocaine was injected without difficulty.    Patient tolerated the procedure well, no obvious complications occurred during the procedure.  Patient was appropriately monitored and discharged home in stable condition with their usual motor strength. The patient will keep close track of pain over the next several hours and call our office tomorrow and let us know what percentage of pain relief is experienced.  Post op Instructions were given to patient. Relevant and recent imaging reviewed with patient today.                  [] Bilateral [] T12 [] S1      [] L1 [] S2     [x] Right [] L2 [] S3      [x] L3      [] Left [x] L4         [x] L5 [] S.I.                       Patient had >80% pain relief following procedure with positive facet joint provocative maneuvers, schedule RF ablation.    LOGAN WILLIAM DO

## 2024-03-12 ENCOUNTER — OFFICE VISIT (OUTPATIENT)
Dept: PAIN MANAGEMENT | Age: 69
End: 2024-03-12
Payer: MEDICARE

## 2024-03-12 DIAGNOSIS — M47.817 LUMBOSACRAL SPONDYLOSIS WITHOUT MYELOPATHY: ICD-10-CM

## 2024-03-12 PROCEDURE — 64635 DESTROY LUMB/SAC FACET JNT: CPT | Performed by: PAIN MEDICINE

## 2024-03-12 PROCEDURE — 64636 DESTROY L/S FACET JNT ADDL: CPT | Performed by: PAIN MEDICINE

## 2024-03-12 RX ORDER — BETAMETHASONE SODIUM PHOSPHATE AND BETAMETHASONE ACETATE 3; 3 MG/ML; MG/ML
6 INJECTION, SUSPENSION INTRA-ARTICULAR; INTRALESIONAL; INTRAMUSCULAR; SOFT TISSUE ONCE
Status: COMPLETED | OUTPATIENT
Start: 2024-03-12 | End: 2024-03-12

## 2024-03-12 RX ORDER — LIDOCAINE HYDROCHLORIDE 10 MG/ML
3 INJECTION, SOLUTION EPIDURAL; INFILTRATION; INTRACAUDAL; PERINEURAL ONCE
Status: COMPLETED | OUTPATIENT
Start: 2024-03-12 | End: 2024-03-12

## 2024-03-12 RX ADMIN — LIDOCAINE HYDROCHLORIDE 3 ML: 10 INJECTION, SOLUTION EPIDURAL; INFILTRATION; INTRACAUDAL; PERINEURAL at 09:51

## 2024-03-12 RX ADMIN — BETAMETHASONE SODIUM PHOSPHATE AND BETAMETHASONE ACETATE 6 MG: 3; 3 INJECTION, SUSPENSION INTRA-ARTICULAR; INTRALESIONAL; INTRAMUSCULAR; SOFT TISSUE at 09:52

## 2024-03-12 NOTE — PROGRESS NOTES
[facility]  [facility]  [address], [address]  [place], [place]  P: [phone]  F: [phone]      Lumbar Radio Frequency Ablation     Provider: LOGAN WILLIAM DO          Patient Name: Viki Jerome : 1955        Date: 3/12/2024      Viki Jerome is here today for interventional pain management.  Standard ASI guidelines were followed and sterile technique used.  Area was cleaned with Betadine x3.  Informed consent was obtained.  Fluoroscopic guidance was used for this procedure. Multiple views of fluoroscopy were used during procedure to assist with needle placement. Appropriate sized RF 10mm active tip needle was used and advance to appropriate anatomic location.    There was appropriate multifidus contraction noted with motor stimulation at 2 Hz between 0.5-1.5 volts. No limb or gluteal contraction was noted taking it up to 3.5 volts. Prior to lesioning at 80 degrees Celsius for 90 seconds, approximately 0.75mg/1mg of Celestone and ½ cc of 1% preservative free Lidocaine was injected. Impedance was between 200-500 ohms during the procedure.     Patient tolerated the procedure well, no obvious complications occurred during the procedure.  Patient was appropriately monitored and discharged home in stable condition with their usual motor strength. Post Op instructions were given to patient.          [] Bilateral [] T11 [] L1 [] S1     [] T12 [] L2 [] S2    [x] Right  [x] L3 [] S3      [x] L4 [] S4    [] Left  [x] L5                              LOGAN WILLIAM DO

## 2024-03-26 ENCOUNTER — OFFICE VISIT (OUTPATIENT)
Dept: PAIN MANAGEMENT | Age: 69
End: 2024-03-26
Payer: MEDICARE

## 2024-03-26 VITALS
TEMPERATURE: 97 F | DIASTOLIC BLOOD PRESSURE: 82 MMHG | BODY MASS INDEX: 39.27 KG/M2 | SYSTOLIC BLOOD PRESSURE: 136 MMHG | WEIGHT: 200 LBS | HEIGHT: 60 IN

## 2024-03-26 DIAGNOSIS — M47.817 LUMBOSACRAL SPONDYLOSIS WITHOUT MYELOPATHY: ICD-10-CM

## 2024-03-26 PROCEDURE — G8427 DOCREV CUR MEDS BY ELIG CLIN: HCPCS | Performed by: NURSE PRACTITIONER

## 2024-03-26 PROCEDURE — G8417 CALC BMI ABV UP PARAM F/U: HCPCS | Performed by: NURSE PRACTITIONER

## 2024-03-26 PROCEDURE — 1036F TOBACCO NON-USER: CPT | Performed by: NURSE PRACTITIONER

## 2024-03-26 PROCEDURE — 3017F COLORECTAL CA SCREEN DOC REV: CPT | Performed by: NURSE PRACTITIONER

## 2024-03-26 PROCEDURE — 1090F PRES/ABSN URINE INCON ASSESS: CPT | Performed by: NURSE PRACTITIONER

## 2024-03-26 PROCEDURE — 3079F DIAST BP 80-89 MM HG: CPT | Performed by: NURSE PRACTITIONER

## 2024-03-26 PROCEDURE — G8484 FLU IMMUNIZE NO ADMIN: HCPCS | Performed by: NURSE PRACTITIONER

## 2024-03-26 PROCEDURE — 3075F SYST BP GE 130 - 139MM HG: CPT | Performed by: NURSE PRACTITIONER

## 2024-03-26 PROCEDURE — 1123F ACP DISCUSS/DSCN MKR DOCD: CPT | Performed by: NURSE PRACTITIONER

## 2024-03-26 PROCEDURE — 99214 OFFICE O/P EST MOD 30 MIN: CPT | Performed by: NURSE PRACTITIONER

## 2024-03-26 PROCEDURE — G8399 PT W/DXA RESULTS DOCUMENT: HCPCS | Performed by: NURSE PRACTITIONER

## 2024-03-26 RX ORDER — TRAMADOL HYDROCHLORIDE 50 MG/1
50 TABLET ORAL 2 TIMES DAILY PRN
Qty: 60 TABLET | Refills: 0 | Status: CANCELLED | OUTPATIENT
Start: 2024-03-26 | End: 2024-04-25

## 2024-03-26 ASSESSMENT — ENCOUNTER SYMPTOMS
EYES NEGATIVE: 1
DIARRHEA: 0
BACK PAIN: 1
TROUBLE SWALLOWING: 0
SHORTNESS OF BREATH: 0
GASTROINTESTINAL NEGATIVE: 1
CONSTIPATION: 0
COUGH: 0

## 2024-03-26 NOTE — PROGRESS NOTES
Viki Jerome  (1955)    3/26/2024    Subjective:     Viki Jerome is 68 y.o. female who complains today of:    Chief Complaint   Patient presents with    Follow-up    Back Pain     Lower         Allergies:  Patient has no known allergies.    Past Medical History:   Diagnosis Date    Anxiety     Cancer (HCC)     skin cancer    Depression     Diverticulitis     Hyperlipidemia     Hypertension     Obesity     Precordial pain 10/9/2018    Urinary incontinence      Past Surgical History:   Procedure Laterality Date    COLONOSCOPY      HYSTERECTOMY (CERVIX STATUS UNKNOWN)      SKIN CANCER EXCISION      forehead    TUBAL LIGATION      UPPER GASTROINTESTINAL ENDOSCOPY  2016    w/bx      Family History   Problem Relation Age of Onset    Stroke Mother     COPD Mother     High Blood Pressure Father     Heart Disease Father     High Blood Pressure Sister     Kidney Disease Sister     High Blood Pressure Brother     High Blood Pressure Sister     High Blood Pressure Sister      Social History     Socioeconomic History    Marital status:      Spouse name: Not on file    Number of children: Not on file    Years of education: Not on file    Highest education level: Not on file   Occupational History    Not on file   Tobacco Use    Smoking status: Former     Current packs/day: 0.00     Types: Cigarettes     Start date: 1971     Quit date: 10/25/1971     Years since quittin.4    Smokeless tobacco: Never    Tobacco comments:     extremely light smoker only for a short time   Substance and Sexual Activity    Alcohol use: No    Drug use: No    Sexual activity: Not on file   Other Topics Concern    Not on file   Social History Narrative    Not on file     Social Determinants of Health     Financial Resource Strain: Not on file   Food Insecurity: Not on file   Transportation Needs: Not on file   Physical Activity: Not on file   Stress: Not on file   Social

## 2024-04-10 ENCOUNTER — TELEPHONE (OUTPATIENT)
Dept: PRIMARY CARE | Facility: CLINIC | Age: 69
End: 2024-04-10
Payer: MEDICARE

## 2024-04-10 NOTE — TELEPHONE ENCOUNTER
PATIENT CALLS REQUESTING TO CANCEL  AT 8:15.  HER SISTER PASSED AWAY,  IS FRIDAY.  I DID NOT CANCEL APPT WAS WANTING A DATE TO RESCHEDULE.  PLEASE ADVISE

## 2024-04-11 DIAGNOSIS — M47.16 LUMBAR SPONDYLOSIS WITH MYELOPATHY: ICD-10-CM

## 2024-04-12 ENCOUNTER — APPOINTMENT (OUTPATIENT)
Dept: PRIMARY CARE | Facility: CLINIC | Age: 69
End: 2024-04-12
Payer: MEDICARE

## 2024-04-12 RX ORDER — TIZANIDINE 4 MG/1
4 TABLET ORAL NIGHTLY PRN
Qty: 30 TABLET | Refills: 0 | Status: SHIPPED | OUTPATIENT
Start: 2024-04-12 | End: 2024-05-09

## 2024-04-19 ENCOUNTER — APPOINTMENT (OUTPATIENT)
Dept: CARDIOLOGY | Facility: HOSPITAL | Age: 69
End: 2024-04-19
Payer: MEDICARE

## 2024-04-19 ENCOUNTER — HOSPITAL ENCOUNTER (EMERGENCY)
Facility: HOSPITAL | Age: 69
Discharge: HOME | End: 2024-04-19
Payer: MEDICARE

## 2024-04-19 ENCOUNTER — APPOINTMENT (OUTPATIENT)
Dept: RADIOLOGY | Facility: HOSPITAL | Age: 69
End: 2024-04-19
Payer: MEDICARE

## 2024-04-19 VITALS
RESPIRATION RATE: 16 BRPM | BODY MASS INDEX: 38.28 KG/M2 | TEMPERATURE: 99.1 F | SYSTOLIC BLOOD PRESSURE: 136 MMHG | HEIGHT: 60 IN | HEART RATE: 71 BPM | OXYGEN SATURATION: 99 % | WEIGHT: 195 LBS | DIASTOLIC BLOOD PRESSURE: 55 MMHG

## 2024-04-19 DIAGNOSIS — U07.1 COVID: Primary | ICD-10-CM

## 2024-04-19 DIAGNOSIS — N39.0 UTI (URINARY TRACT INFECTION), UNCOMPLICATED: ICD-10-CM

## 2024-04-19 LAB
ALBUMIN SERPL BCP-MCNC: 3.9 G/DL (ref 3.4–5)
ALP SERPL-CCNC: 84 U/L (ref 33–136)
ALT SERPL W P-5'-P-CCNC: 20 U/L (ref 7–45)
ANION GAP SERPL CALC-SCNC: 14 MMOL/L (ref 10–20)
APPEARANCE UR: ABNORMAL
AST SERPL W P-5'-P-CCNC: 25 U/L (ref 9–39)
BACTERIA #/AREA URNS AUTO: ABNORMAL /HPF
BASOPHILS # BLD AUTO: 0.02 X10*3/UL (ref 0–0.1)
BASOPHILS NFR BLD AUTO: 0.3 %
BILIRUB SERPL-MCNC: 0.7 MG/DL (ref 0–1.2)
BILIRUB UR STRIP.AUTO-MCNC: NEGATIVE MG/DL
BUN SERPL-MCNC: 30 MG/DL (ref 6–23)
CALCIUM SERPL-MCNC: 9 MG/DL (ref 8.6–10.3)
CARDIAC TROPONIN I PNL SERPL HS: 11 NG/L (ref 0–13)
CHLORIDE SERPL-SCNC: 102 MMOL/L (ref 98–107)
CO2 SERPL-SCNC: 25 MMOL/L (ref 21–32)
COLOR UR: ABNORMAL
CREAT SERPL-MCNC: 1.63 MG/DL (ref 0.5–1.05)
EGFRCR SERPLBLD CKD-EPI 2021: 34 ML/MIN/1.73M*2
EOSINOPHIL # BLD AUTO: 0.14 X10*3/UL (ref 0–0.7)
EOSINOPHIL NFR BLD AUTO: 2 %
ERYTHROCYTE [DISTWIDTH] IN BLOOD BY AUTOMATED COUNT: 12.6 % (ref 11.5–14.5)
FLUAV RNA RESP QL NAA+PROBE: NOT DETECTED
FLUBV RNA RESP QL NAA+PROBE: NOT DETECTED
GLUCOSE SERPL-MCNC: 132 MG/DL (ref 74–99)
GLUCOSE UR STRIP.AUTO-MCNC: NEGATIVE MG/DL
HCT VFR BLD AUTO: 46.1 % (ref 36–46)
HGB BLD-MCNC: 15.6 G/DL (ref 12–16)
HOLD SPECIMEN: NORMAL
HYALINE CASTS #/AREA URNS AUTO: ABNORMAL /LPF
IMM GRANULOCYTES # BLD AUTO: 0.01 X10*3/UL (ref 0–0.7)
IMM GRANULOCYTES NFR BLD AUTO: 0.1 % (ref 0–0.9)
KETONES UR STRIP.AUTO-MCNC: ABNORMAL MG/DL
LACTATE SERPL-SCNC: 1 MMOL/L (ref 0.4–2)
LACTATE SERPL-SCNC: 2.7 MMOL/L (ref 0.4–2)
LEUKOCYTE ESTERASE UR QL STRIP.AUTO: ABNORMAL
LIPASE SERPL-CCNC: 51 U/L (ref 9–82)
LYMPHOCYTES # BLD AUTO: 0.99 X10*3/UL (ref 1.2–4.8)
LYMPHOCYTES NFR BLD AUTO: 13.9 %
MAGNESIUM SERPL-MCNC: 2.13 MG/DL (ref 1.6–2.4)
MCH RBC QN AUTO: 31.3 PG (ref 26–34)
MCHC RBC AUTO-ENTMCNC: 33.8 G/DL (ref 32–36)
MCV RBC AUTO: 92 FL (ref 80–100)
MONOCYTES # BLD AUTO: 0.8 X10*3/UL (ref 0.1–1)
MONOCYTES NFR BLD AUTO: 11.2 %
NEUTROPHILS # BLD AUTO: 5.16 X10*3/UL (ref 1.2–7.7)
NEUTROPHILS NFR BLD AUTO: 72.5 %
NITRITE UR QL STRIP.AUTO: NEGATIVE
NRBC BLD-RTO: 0 /100 WBCS (ref 0–0)
PH UR STRIP.AUTO: 5 [PH]
PLATELET # BLD AUTO: 249 X10*3/UL (ref 150–450)
POTASSIUM SERPL-SCNC: 3.3 MMOL/L (ref 3.5–5.3)
PROT SERPL-MCNC: 7.2 G/DL (ref 6.4–8.2)
PROT UR STRIP.AUTO-MCNC: ABNORMAL MG/DL
RBC # BLD AUTO: 4.99 X10*6/UL (ref 4–5.2)
RBC # UR STRIP.AUTO: NEGATIVE /UL
RBC #/AREA URNS AUTO: ABNORMAL /HPF
SARS-COV-2 RNA RESP QL NAA+PROBE: DETECTED
SODIUM SERPL-SCNC: 138 MMOL/L (ref 136–145)
SP GR UR STRIP.AUTO: 1.02
SQUAMOUS #/AREA URNS AUTO: ABNORMAL /HPF
UROBILINOGEN UR STRIP.AUTO-MCNC: <2 MG/DL
WBC # BLD AUTO: 7.1 X10*3/UL (ref 4.4–11.3)
WBC #/AREA URNS AUTO: ABNORMAL /HPF

## 2024-04-19 PROCEDURE — 99285 EMERGENCY DEPT VISIT HI MDM: CPT | Mod: 25

## 2024-04-19 PROCEDURE — C9113 INJ PANTOPRAZOLE SODIUM, VIA: HCPCS

## 2024-04-19 PROCEDURE — 81001 URINALYSIS AUTO W/SCOPE: CPT

## 2024-04-19 PROCEDURE — 83605 ASSAY OF LACTIC ACID: CPT

## 2024-04-19 PROCEDURE — 2500000006 HC RX 250 W HCPCS SELF ADMINISTERED DRUGS (ALT 637 FOR ALL PAYERS)

## 2024-04-19 PROCEDURE — 83735 ASSAY OF MAGNESIUM: CPT

## 2024-04-19 PROCEDURE — 80053 COMPREHEN METABOLIC PANEL: CPT

## 2024-04-19 PROCEDURE — 96361 HYDRATE IV INFUSION ADD-ON: CPT

## 2024-04-19 PROCEDURE — 84484 ASSAY OF TROPONIN QUANT: CPT

## 2024-04-19 PROCEDURE — 74176 CT ABD & PELVIS W/O CONTRAST: CPT | Mod: FOREIGN READ | Performed by: RADIOLOGY

## 2024-04-19 PROCEDURE — 93005 ELECTROCARDIOGRAM TRACING: CPT

## 2024-04-19 PROCEDURE — 36415 COLL VENOUS BLD VENIPUNCTURE: CPT

## 2024-04-19 PROCEDURE — 85025 COMPLETE CBC W/AUTO DIFF WBC: CPT

## 2024-04-19 PROCEDURE — 2500000004 HC RX 250 GENERAL PHARMACY W/ HCPCS (ALT 636 FOR OP/ED)

## 2024-04-19 PROCEDURE — 96374 THER/PROPH/DIAG INJ IV PUSH: CPT

## 2024-04-19 PROCEDURE — 83690 ASSAY OF LIPASE: CPT

## 2024-04-19 PROCEDURE — 71045 X-RAY EXAM CHEST 1 VIEW: CPT | Mod: FOREIGN READ | Performed by: RADIOLOGY

## 2024-04-19 PROCEDURE — 71045 X-RAY EXAM CHEST 1 VIEW: CPT

## 2024-04-19 PROCEDURE — 87636 SARSCOV2 & INF A&B AMP PRB: CPT

## 2024-04-19 PROCEDURE — 74176 CT ABD & PELVIS W/O CONTRAST: CPT

## 2024-04-19 PROCEDURE — 87086 URINE CULTURE/COLONY COUNT: CPT | Mod: ELYLAB

## 2024-04-19 RX ORDER — PANTOPRAZOLE SODIUM 40 MG/10ML
40 INJECTION, POWDER, LYOPHILIZED, FOR SOLUTION INTRAVENOUS ONCE
Status: COMPLETED | OUTPATIENT
Start: 2024-04-19 | End: 2024-04-19

## 2024-04-19 RX ORDER — CEPHALEXIN 500 MG/1
500 CAPSULE ORAL 2 TIMES DAILY
Qty: 10 CAPSULE | Refills: 0 | Status: SHIPPED | OUTPATIENT
Start: 2024-04-19 | End: 2024-04-24

## 2024-04-19 RX ORDER — POTASSIUM CHLORIDE 20 MEQ/1
40 TABLET, EXTENDED RELEASE ORAL DAILY
Status: DISCONTINUED | OUTPATIENT
Start: 2024-04-19 | End: 2024-04-20 | Stop reason: HOSPADM

## 2024-04-19 RX ORDER — BENZONATATE 100 MG/1
100 CAPSULE ORAL 3 TIMES DAILY PRN
Qty: 21 CAPSULE | Refills: 0 | Status: SHIPPED | OUTPATIENT
Start: 2024-04-19 | End: 2024-04-26

## 2024-04-19 RX ORDER — MORPHINE SULFATE 4 MG/ML
4 INJECTION, SOLUTION INTRAMUSCULAR; INTRAVENOUS ONCE
Status: COMPLETED | OUTPATIENT
Start: 2024-04-19 | End: 2024-04-19

## 2024-04-19 RX ORDER — PROMETHAZINE HYDROCHLORIDE 12.5 MG/1
12.5 TABLET ORAL EVERY 6 HOURS PRN
Qty: 28 TABLET | Refills: 0 | Status: SHIPPED | OUTPATIENT
Start: 2024-04-19 | End: 2024-04-26

## 2024-04-19 RX ORDER — NAPROXEN SODIUM 550 MG/1
550 TABLET ORAL
Qty: 14 TABLET | Refills: 0 | Status: SHIPPED | OUTPATIENT
Start: 2024-04-19 | End: 2024-04-19

## 2024-04-19 RX ORDER — KETOROLAC TROMETHAMINE 30 MG/ML
30 INJECTION, SOLUTION INTRAMUSCULAR; INTRAVENOUS ONCE
Status: COMPLETED | OUTPATIENT
Start: 2024-04-19 | End: 2024-04-19

## 2024-04-19 RX ORDER — ONDANSETRON HYDROCHLORIDE 2 MG/ML
4 INJECTION, SOLUTION INTRAVENOUS ONCE
Status: COMPLETED | OUTPATIENT
Start: 2024-04-19 | End: 2024-04-19

## 2024-04-19 RX ORDER — ACETAMINOPHEN 500 MG
1000 TABLET ORAL EVERY 6 HOURS PRN
Qty: 30 TABLET | Refills: 0 | Status: SHIPPED | OUTPATIENT
Start: 2024-04-19 | End: 2024-04-26

## 2024-04-19 RX ADMIN — KETOROLAC TROMETHAMINE 30 MG: 30 INJECTION, SOLUTION INTRAMUSCULAR at 18:35

## 2024-04-19 RX ADMIN — SODIUM CHLORIDE 1000 ML: 9 INJECTION, SOLUTION INTRAVENOUS at 18:32

## 2024-04-19 RX ADMIN — ONDANSETRON 4 MG: 2 INJECTION INTRAMUSCULAR; INTRAVENOUS at 18:33

## 2024-04-19 RX ADMIN — SODIUM CHLORIDE 500 ML: 9 INJECTION, SOLUTION INTRAVENOUS at 19:56

## 2024-04-19 RX ADMIN — PROMETHAZINE HYDROCHLORIDE 12.5 MG: 25 INJECTION INTRAMUSCULAR; INTRAVENOUS at 21:07

## 2024-04-19 RX ADMIN — MORPHINE SULFATE 4 MG: 4 INJECTION, SOLUTION INTRAMUSCULAR; INTRAVENOUS at 18:34

## 2024-04-19 RX ADMIN — POTASSIUM CHLORIDE 40 MEQ: 1500 TABLET, EXTENDED RELEASE ORAL at 19:56

## 2024-04-19 RX ADMIN — PANTOPRAZOLE SODIUM 40 MG: 40 INJECTION, POWDER, FOR SOLUTION INTRAVENOUS at 18:36

## 2024-04-19 ASSESSMENT — COLUMBIA-SUICIDE SEVERITY RATING SCALE - C-SSRS
1. IN THE PAST MONTH, HAVE YOU WISHED YOU WERE DEAD OR WISHED YOU COULD GO TO SLEEP AND NOT WAKE UP?: NO
6. HAVE YOU EVER DONE ANYTHING, STARTED TO DO ANYTHING, OR PREPARED TO DO ANYTHING TO END YOUR LIFE?: NO
2. HAVE YOU ACTUALLY HAD ANY THOUGHTS OF KILLING YOURSELF?: NO

## 2024-04-19 ASSESSMENT — PAIN SCALES - GENERAL
PAINLEVEL_OUTOF10: 8
PAINLEVEL_OUTOF10: 7
PAINLEVEL_OUTOF10: 8

## 2024-04-19 ASSESSMENT — LIFESTYLE VARIABLES
HAVE PEOPLE ANNOYED YOU BY CRITICIZING YOUR DRINKING: NO
TOTAL SCORE: 0
EVER HAD A DRINK FIRST THING IN THE MORNING TO STEADY YOUR NERVES TO GET RID OF A HANGOVER: NO
EVER FELT BAD OR GUILTY ABOUT YOUR DRINKING: NO
HAVE YOU EVER FELT YOU SHOULD CUT DOWN ON YOUR DRINKING: NO

## 2024-04-19 ASSESSMENT — PAIN - FUNCTIONAL ASSESSMENT: PAIN_FUNCTIONAL_ASSESSMENT: 0-10

## 2024-04-19 NOTE — ED PROVIDER NOTES
"HPI   Chief Complaint   Patient presents with    Flu Symptoms     'Here for body aches headache vomiting diarrhea runny nose cough.\"        History provided by: Patient    Limitations to history: None    CC: Multiple medical complaints    HPI: 68-year-old female presents emergency department to be evaluated for multiple medical complaints.  Patient states that her symptoms started about 6 days ago.  States that she has had this generalized headache.  Denies being the worst headache she is ever had denies it having sudden or abrupt onset.  Denies vision changes, neck pain and stiffness.  Denies any head injury or the use of anticoagulants or antiplatelets.  Denies history intracranial hemorrhage.  Denies lightheadedness and dizziness.  States that she is also had a runny nose and congestion with green rhinorrhea.  Reports a nonproductive cough.  States that she is also having chest pain and shortness of breath it has been present around the same time.  She characterized the pain as \"aching \"and says that it seen in the middle of her chest.  Denies anything that makes it better or worse including coughing and breathing.  Denies any hemoptysis.  She states that the shortness of breath is seen with exertion.  She denies history of COPD or asthma or use of breathing treatments, she has never smoked.  Denies history of heart failure and denies pain or swelling in the extremities.  Denies history of DVTs or PEs and denies recent plane flights or surgeries.  Denies use of OCPs or history of cancer.  Denies history of ACS, it has been a while since she has had any type of stress test but is never required stent placement or bypass.  She was recently at a  so was not sure if she was exposed to someone not feeling well there.  Reports body aches and chills but denies fever.  Also reports watery, nonbloody diarrhea.  Denies recent antibiotic use or history of C. difficile.  Reports a few episodes of nausea and nonbloody " vomiting.  Also reports epigastric abdominal pain.  Has been taking Tylenol ibuprofen for her generalized discomfort.  Is overall been able to tolerate p.o. intake.  States that her urine appears to be darkened but denies urgency, frequency, dysuria, hematuria.  Denies flank pain.  Denies blood in the urine or stool.  Denies weakness and fatigue.  Denies all other systemic symptoms.    ROS: Negative unless mentioned in HPI    Social Hx: Denies tobacco, alcohol, drug use    Medical Hx: Medical history significant for osteoarthritis, hypertension, depression.  Denies allergies.  Immunizations up-to-date.    Physical exam:    Constitutional: Patient is well-nourished and well-developed.  Sitting comfortably in the room and in no distress.  Oriented to person, place, time, and situation.    HEENT: Head is normocephalic, atraumatic. Patient's airway is patent.  Tympanic membranes are clear bilaterally.  Nasal congestion and rhinorrhea noted.  No nuchal rigidity or meningeal signs.  Mouth with normal mucosa.  Throat is not erythematous and there are no oropharyngeal exudates, uvula is midline.  No obvious facial deformities.    Eyes: Clear bilaterally.  Pupils are equal round and reactive to light and accommodation.  Extraocular movements intact.      Cardiac: Regular rate, regular rhythm.  Heart sounds S1, S2.  No murmurs, rubs, or gallops.  PMI nondisplaced.  No JVD.    Respiratory: 97% on room air.  Regular respiratory rate and effort.  Breath sounds are clear and equal bilaterally, no adventitious lung sounds.  Patient is speaking in full sentences and is in no apparent respiratory distress. No use of accessory muscles.      Gastrointestinal: Epigastric abdominal tenderness palpation.  Abdomen is soft and nondistended.  There are no obvious deformities.  No rebound tenderness or guarding.  Bowel sounds are normal active.    Genitourinary: No CVA or flank tenderness.    Musculoskeletal: No reproducible tenderness.  No  obvious skin or bony deformities.  Patient has equal range of motion in all extremities and no strength deficiencies.  No muscle or joint tenderness. No back or neck tenderness.  Capillary refill less than 3 seconds.  Strong peripheral pulses.  No sensory deficits.    Neurological: Patient is alert and oriented.  No focal deficits.  5/5 strength in all extremities.  Cranial nerves II through XII intact. GCS15.     Skin: Skin is normal color for race and is warm, dry, and intact.  No evidence of trauma.  No lesions, rashes, bruising, jaundice, or masses.    Psych: Appropriate mood and affect.  No apparent risk to self or others.    Heme/lymph: No adenopathy, lymphadenopathy, or splenomegaly    Physical exam is otherwise negative unless stated above or in history of present illness.                              Dylan Coma Scale Score: 15                     Patient History   Past Medical History:   Diagnosis Date    Acute non-recurrent maxillary sinusitis 02/15/2023    Anxiety 2011    Arthritis 2022    Depression 2011    Headache 10/2023    Hypertension N/A    Otalgia of both ears 02/15/2023     Past Surgical History:   Procedure Laterality Date    ADENOIDECTOMY  1960    BREAST SURGERY  1995    HYSTERECTOMY  2005    OTHER SURGICAL HISTORY  09/03/2020    Tonsillectomy    OTHER SURGICAL HISTORY  09/03/2020    Knee arthroscopy    OTHER SURGICAL HISTORY  09/03/2020    Hysterectomy    OTHER SURGICAL HISTORY  04/02/2021    Colonoscopy    TONSILLECTOMY  1960    TUBAL LIGATION  1976    WISDOM TOOTH EXTRACTION  1990     Family History   Problem Relation Name Age of Onset    Hypertension Mother Eugenia Santos     Arthritis Mother Eugenia Santos     COPD Mother Eugenia Santos     Hearing loss Mother Eugenia Santos     Heart disease Mother Eugenia Santos     Stroke Mother Eugenia Santos     Heart disease Other       Social History     Tobacco Use    Smoking status: Never    Smokeless tobacco: Never   Vaping Use    Vaping status:  Never Used   Substance Use Topics    Alcohol use: Never     Comment: Only have a cocktail at social occasions    Drug use: Never       Physical Exam   ED Triage Vitals [04/19/24 1750]   Temperature Heart Rate Respirations BP   37.3 °C (99.1 °F) 81 (!) 22 116/76      Pulse Ox Temp Source Heart Rate Source Patient Position   97 % Temporal Monitor Sitting      BP Location FiO2 (%)     Right arm --       Physical Exam    ED Course & MDM   Diagnoses as of 04/19/24 6877   COVID   UTI (urinary tract infection), uncomplicated     Patient updated on plan for lab testing, IV insertion, radiology imaging, and medications to be administered while in the ER (if indicated). Patient updated on expected wait times for testing and results. Patient provided my name and told to ask any staff member for questions or concerns if they should arise. Electronic medical record reviewed.     MDM    Upon initial assessment, patient was healthy non-toxic appearing and in no apparent distress.     Patient presented to the emergency department with the chief complaint multiple medical complaints including headache, runny nose, cough, congestion, nausea vomiting, abdominal pain, diarrhea, darkened urine.her breath sounds are clear and equal bilaterally, she is 97% on room air.  She is not tachypneic and has no increased work of breathing.  No adventitious sounds.  No acute respiratory distress.  No muffled heart sounds, JVD, murmur.  No peripheral edema or erythema.  No neurological deficits.  Patient does have congestion and rhinorrhea noted on exam but no nuchal rigidity meningeal signs.  She does have mild epigastric abdominal tenderness to palpation but abdomen is soft and nondistended.  On arrival to the emergency department, vital signs were within normal limits    Will give the patient a liter of IV normal saline as well as Zofran, Protonix, morphine for her discomfort.  Will get basic blood work, EKG and troponin, BNP, coagulation screen,  swab for COVID and the flu, urinalysis, chest x-ray, CT abdomen and pelvis.  Low suspicion for PE at this time.  Patient is not hypoxic or tachycardic, she has no history of blood clots and history physical exam is not consistent with blood clot at this time.  Wells criteria is 0.    Patient states that she was feeling slightly nauseous after the Zofran but is able to tolerate p.o. intake now after the Phenergan.  She says that she is feeling much better and she would like to go home.  She able to walk without feeling lightheaded or weak.  Urinalysis reveals potentially developing urinary tract infection.  Lactate is 1.0.  COVID is positive.  Troponin is 11 making ACS unlikely.  Lipase and magnesium within normal limits.  CMP shows hypokalemia 3.3.  Patient has a moderate acute kidney injury with a creatinine 1.63, GFR 34, BUN of 30.  CBC shows no leukocytosis or anemia.  Chest x-ray feels no pneumonia    CT shows degenerative change in the lumbar spine as well as diverticulosis without diverticulitis and fatty liver the morphology.  Patient also had fluid distention in the stomach but no obstruction.  Her EKG is performed at 1830 interpreted by me.  Normal sinus rhythm 86 beats minute.  Left axis deviation.  No ST elevation or depression.  No prolonged QT.  I discussed the disposition with the patient and I offered her admission for IV hydration especially given her acute kidney injury however she deferred to go home at this time.  She is feeling much better.  We did give her a liter and a half of fluids and she reassured me that she will follow-up with her PCP within the next few days to have her kidney function rechecked.  She will be discharged with Tylenol for her body aches.  Recommend that she avoid NSAIDs until she has her kidney function rechecked.  Will give her benzonatate for her cough, Phenergan for her nausea, and Keflex for her UTI.  Will follow-up with her PCP.  I discussed the importance of drinking  plenty fluids and resting.  All questions and concerns addressed.  Reasons to return to ER discussed.  Patient verbalized understanding and agreement with the treatment plan and they remained hemodynamically stable in the ER.    This note was dictated using a speech recognition program.  While an attempt was made at proof-reading to minimize errors, minor errors in transcription may be present    Medical Decision Making      Procedure  Procedures     Giuseppe Guerrero PA-C  04/19/24 2355

## 2024-04-20 LAB
ATRIAL RATE: 86 BPM
HOLD SPECIMEN: NORMAL
P AXIS: 42 DEGREES
P OFFSET: 175 MS
P ONSET: 125 MS
PR INTERVAL: 182 MS
Q ONSET: 216 MS
QRS COUNT: 14 BEATS
QRS DURATION: 86 MS
QT INTERVAL: 386 MS
QTC CALCULATION(BAZETT): 461 MS
QTC FREDERICIA: 435 MS
R AXIS: -44 DEGREES
T AXIS: 54 DEGREES
T OFFSET: 409 MS
VENTRICULAR RATE: 86 BPM

## 2024-04-21 LAB — BACTERIA UR CULT: ABNORMAL

## 2024-04-23 NOTE — ASSESSMENT & PLAN NOTE
Chronic Condition Documentation : Stable based on symptoms and exam.  Continue established treatment plan and follow-up at least yearly.    DATE OF SERVICE:  4/23/2024     CLINIC:  [facility]  [address] [facility]  [place] [id number] [phone]    CHIEF COMPLAINT:   Chief Complaint   Patient presents with    Consultation       REFERRING PHYSICIAN: Kam Sanchez MD    PRIMARY CARE PHYSICIAN: Kam Sanchez MD    The recording was initiated after a verbal consent was obtained from the patient to record this visit for documentation in their clinical record.     HISTORY OF PRESENT ILLNESS:  Patient is seen today at the kind referral of Dr. Sanchez for evaluation of increasing right upper lung nodule.  Available electronic medical records are reviewed.  Patient has a history of smoking for which she has been on the Annual CT lung screening program for 4-5 years. No major symptoms except breathing issues for 6-9 months. CT scan showed evidence of lung nodule.     SUBJECTIVE: Reports her breathing has changed since a few months and relates it to the anemia. Reports in February 2023 she was informed of being anemic by Dr. Sanchez. She has difficulty walking up to the car. Her last colonoscopy was a couple of years ago which was negative and was done to rule out any GI bleeding as she was anemic. She did have an upper endoscopy for reflux symptoms.   She feels she had an infection with relation to her increasing right lung nodule. She has COPD/emphysema and has persistent cough. Mentions coughing up blood in the past. She is trying to quit smoking and has used Naltrexone and Wellbutrin for it. She has tried nicotine lozenges and gums with no benefits. She had quit smoking once,15 years ago, for 6 months. Denies family history of GI cancer.     REVIEW OF SYSTEMS:  Reviewed and verified per nursing assessment as noted below:  Spring Arceo MA  4/23/2024  1:47 PM  Sign when Signing Visit  Gerri Rios is a 59 year old female here for  Chief Complaint   Patient presents with    Consultation     Denies latex allergy or sensitivity.   Pediatric OT Evaluation      Today's date: 2018   Patient name: Devonte Garsia      : 2019       Age: 13 m o        School/Grade: N/A  MRN: [id number]  Referring provider: Mary Alarcon DO  Dx:   Encounter Diagnosis     ICD-10-CM    1  Spastic quadriplegic cerebral palsy ([address] ) G80 0        Start Time: 2946  Stop Time: 1845  Total time in clinic (min): 75 minutes    Age at onset: Birth  Parent/caregiver concerns: Parent/caregiver concerned with Batool's ability to ride in her car seat without crying, as well as her ability to bear weight through open hands  Background   Medical History: No past medical history on file  Allergies: No Known Allergies  Current Medications:   No current outpatient prescriptions on file  No current facility-administered medications for this visit  Developmental Milestones:    Held Head Up: Delayed    Rolled: Delayed  - Her parent/caregiver report that she was very close to rolling on her own and is now showing very little interest in rolling  Crawled: N/A   Walked Independently: N/A   Toilet Trained: N/A  Current/Previous Therapies: PT, OT and Vision  Lifestyle: Home environment: [unfilled] currently resides at home with her mother and sibling  Assessment Method: Parent/caregiver interview, Clinical observations , Records Review  and consultation with current outpatient physical therapist  Behavior: During the evaluation Batool had periods where she would become upset and fuss  She was able to soothe and calm when held by a parent or caregiver and did require her pacifier at times  Her mother reports that Nate Moore can be difficult to console at times at home when she becomes upset      Equipment used: Equipment is currently in the process of being ordered through her physical therapist   Neuromuscular Motor:   Protective Responses Anterior Absent, Lateral Absent and Posterior Absent   Righting Reactions:  Absent in all planes as tested on therapy   Medication verified, no changes.  PCP and Pharmacy verified.    Social History     Tobacco Use   Smoking Status Every Day    Current packs/day: 1.00    Average packs/day: 1.9 packs/day for 46.2 years (87.7 ttl pk-yrs)    Types: Cigarettes    Start date: 2/13/1978   Smokeless Tobacco Never   Tobacco Comments    1 ppd (4/22/24)      Advance Directives Filed: No    ECOG:   ECOG [04/23/24 1341]   ECOG Performance Status 0       Vitals:    Visit Vitals  /59   Pulse (!) 117   Temp 98 °F (36.7 °C) (Temporal)   Resp 16   Wt 70.2 kg (154 lb 11.2 oz)   SpO2 96%   BMI 29.23 kg/m²       These vital signs are:  Within defined parameters (Per Reference \"Defined Limits Hospital Outpatient Department (HOD)\")    Height: No.  Ht Readings from Last 1 Encounters:   04/22/24 5' 1\" (1.549 m)     Weight:Yes, shoes on.  Wt Readings from Last 3 Encounters:   04/23/24 70.2 kg (154 lb 11.2 oz)   04/22/24 69.9 kg (154 lb)   04/18/24 70.3 kg (155 lb)       BMI: Body mass index is 29.23 kg/m².    REVIEW OF SYSTEMS  GENERAL:  Patient denies fevers, chills, change in appetite, weight loss, but complains of: headache, night sweats, excessive fatigue, and dizziness  ALLERGIC/IMMUNOLOGIC: Verified allergies: Yes  EYES:  Patient denies significant visual difficulties, double vision, blurred vision  ENT/MOUTH: Patient denies problems with hearing, mouth sores, but complains of: sore throat and sinus drainage  ENDOCRINE:  Patient denies diabetes, thyroid disease, hormone replacement, hot flashes  HEMATOLOGIC/LYMPHATIC: Patient denies easy bruising, bleeding, tender lymph nodes, swollen lymph nodes  BREASTS: Patient denies abnormal masses of breast, nipple discharge, pain  RESPIRATORY:  Patient denies lung pain with breathing,, coughing up blood but complains of: cough and shortness of breath  CARDIOVASCULAR:  Patient denies anginal chest pain, palpitations, shortness of breath when lying flat, peripheral edema  GASTROINTESTINAL: Patient denies  ball  Muscle Tone Trunk Hypertonic, Shoulder girdle Hypertonic, Extremities Hypertonic and Hand Hypertonic  Posture:   Sitting: not yet able to sit independently; she is beginning to attempt to put weight through extended BUE in a position that is slumped forward with rounded back  Standing: not appropriate to assess at this time as Sherill Lefort is not yet standing   Quadruped:  Requires max A for positioning; prefers patterns of extension and requires max A to facilitate weight bearing through BUE; preference for fisted hands  Standardized testing:   Not appropriate to assess with standardized testing at this time  Will continue to monitor the need as appropriate  Fine Motor Skills:   Grasp pattern(s) achieved: Sherill Lefort will inconsistently grasp toys with a gross grasp  She prefers to keep her hands in a fisted position with thumbs tucked while weight bearing and about 50% of the time while engaged in play and attempting to grasp toys  A Kandace sizing splint was trialed during this evaluation with Batool maintaining her L thumb in adduction/extension resulting in a more relaxed hand position  She was not interested in grasping toys during this session  Vision:  Batool briefly maintained eye contact with this therapist for up to 5 seconds at a time with therapist within 6-8 inches from her face  She would visually track therapist's face about 10 degrees in each direction horizontally in a well-lit room  Her mother reports that Sherill Lefort is able to visually track a flashlight in a dark room with improvement  ADLs/Self-care skills: Dressing  Sherill Lefort is dependent for dressing at this time  Per parent report, she is not yet assisting with dressing by extending arms and legs through openings of clothing , Bathing/Hygiene and Toileting  Dependent for toileting as is age appropriate  Her mother reports that she loves bath time  and Feeding  Child is able to hold a spoon and Batool still drinks from a bottle    Her mother reports abdominal pain , nausea, vomiting, diarrhea, GI bleeding, constipation, change in bowel habits, heartburn, sensation of feeling full, difficulty swallowing  : Patient denies abnormal genital masses, blood in the urine, frequency, urgency, burning with urination, hesitancy, incontinence, vaginal bleeding, discharge  MUSCULOSKELETAL:  Patient denies joint pain, bone pain, joint swelling, redness, decreased range of motion  SKIN:  Patient denies chronic rashes, inflammation, ulcerations, skin changes, itching  NEUROLOGIC:  Patient denies loss of balance, areas of focal weakness, abnormal gait, sensory problems, numbness, tingling, but complains of: numbness R foot  PSYCHIATRIC: Patient complains of: insomnia, depression, and anxiety -is being treated for all    This patient reported abnormal symptoms that needed immediate verbal communication: No         PAST MEDICAL HISTORY    Tendonitis                                                    CTS (carpal tunnel syndrome)                                  COPD (chronic obstructive pulmonary disease) (*               Depression                                                    Anxiety                                                       PTSD (post-traumatic stress disorder)                         Cerebral infarction (CMD)                                       Comment: at age 16     Myocardial infarction (CMD)                     2018     Coronary artery disease                                         Comment: S/P PTCA/stent    Endometriosis                                                  PAST SURGICAL HISTORY    HYSTERECTOMY - CERVIX REMOVED                              SECTION, CLASSIC                                       Comment: 10/25/89 and 91    APPENDECTOMY                                    2017    CHOLECYSTECTOMY                                 2017    CARPAL TUNNEL RELEASE                                           Comment:  that she would like to work on transitioning her to a straw cup  She inconsistently grasps a spoon, but does not attempt to bring it to her mouth  She is currently eating a variety of Stage 3 purees and soft table foods  Car Seat:  Navneet De La Torre currently rides in a 1919 [employer] seat in the rear facing position  Parent/caregiver was educated regarding the proper installation of the seat as it was installed incorrectly  When initially placed into the car seat, Navneet De La Torre began to fuss and push her self into extension  She also presents with difficulty with maintaining her head and body in proper alignment  OT also educated parent/caregiver regarding the appropriate tightness of the harness, as Batool's harness was too loose  Her mother reports that she does not like the harness tight  OT educated mother on the importance of making sure the harness is appropriately adjusted so that she cannot pinch the webbing between her fingers at Batool's shoulders  Educated parent/caregiver regarding approved items to use for positioning, including pool noodles and blanket/towel rolls  Assessment:    Strengths: supportive family network ; engages in activities with lights and sounds   Comments:    Limitations: decreased bilateral motor skills, decreased body awareness, decreased fine motor skills, decreased gross motor skills, decreased upper extremity coordination, decreased postural control, decreased strength and delayed developmental milestones   Comments:     Treatment Plan:   Skilled Occupational Therapy is recommended 1 times per week for 12 weeks in order to address goals listed below  Short term goals:  1  Procure an appropriate car seat as available or adapt Batool's current car seat to be functional within NHTSA guidelines within 3 visits  2   Procure Kandace thumb splints for Batool within 1 month    3   Batool will drink from an adaptive straw cup with no more than mod A 50% of given opportunities in 12  and      CARDIAC CATHETERIZATION                                         Comment: one stent    CORONARY STENT PLACEMENT                                      ABDOMEN SURGERY                                               ESOPHAGOGASTRODUODENOSCOPY TRANSORAL FLEX W/BX* 10/31/2022    TOTAL HIP REPLACEMENT                           2023      Comment: Direct anterior approach / [place] Alvarez Gilbert [place]                [facility] [place]     SOCIAL HISTORY    Tobacco Use: Yes           Packs/Day:       Years:         Alcohol Use: No                FAMILY HISTORY  Family History   Problem Relation Age of Onset    COPD Father     Cancer Father         Bladder    Psychiatric Sister     Psychiatric Brother     Asthma Daughter     Myocardial Infarction Maternal Grandmother     Obesity Maternal Grandmother     Myocardial Infarction Maternal Grandfather     Postmenopausal breast cancer Maternal Aunt 80    Postmenopausal breast cancer Maternal Cousin 68    Gastrointestinal Other         Gallstones    Review of patient's family status indicates:    Mother                         Alive                     Father                                           Sister                         Alive                     Brother                        Alive                     Brother                                          Daughter                       Alive                     Son                            Alive                     Maternal Grandmother                         80's    Maternal Grandfather                             Paternal Grandmother                             Paternal Grandfather                             Maternal Aunt                                            Maternal Cousin                                          Other                                                      ALLERGIES  ALLERGIES:  Sulfa antibiotics    MEDICATIONS  Current Outpatient Medications   Medication  weeks   4   Batool will weight bear through BUE for at least 15 seconds with no more than mod A, 50% of given opportunities in 12 weeks  5   Batool will visually track a high contrast moving object horizontally at least 45 degrees past midline, 50% of given opportunities in 12 weeks  6   Molly Pineda will consistently grasp presented textured objects following tactile input to hands independently at least 50% of given opportunities  Long term goals:  1  Procure appropriate DME and splints for Batool  2   Improve postural control and hand skills for increased independence in play  Summary & Recommendations:     Audrey Wade was referred for an Occupational Therapy evaluation to assess concerns related to her ability to maintain a functional, safe position in her car seat without fussing, as well as concerns regarding her ability to weight bear through open hands  Batool's poor postural control and hypertonicity impact her ability to maintain a comfortable, safe position in commercially available car seats  In addition, her increased tone affects her ability to use her hands in a functional way to grasp and play with toys, as well as bear weight through her hands  Lack of weight bearing through open palms will affect the development of Batool's hand arches and in turn affect her grasp prehension development  Skilled Occupational Therapy is recommended in order to address performance skills and goals as listed above   It is recommended that Batool receive outpatient OT (1 time/week) as needed to improve performance and independence in (ADLs, School, Home Environment, and [place])     Frequency: 1x/week    Duration: 12 weeks         Assessment  Impairments: abnormal coordination, abnormal muscle tone, activity intolerance, impaired balance, impaired physical strength and weight-bearing intolerance  Other impairment: impaired postural control  Functional limitations: ability to engage in age appropriate play; ADLs (dressing/feeding); ability to maintain positioning for riding in her car seat to be transported in the community  Assessment details: Tammy Blanco was referred for an Occupational Therapy evaluation to assess concerns related to her ability to maintain a functional, safe position in her car seat without fussing, as well as concerns regarding her ability to weight bear through open hands  Batool's poor postural control and hypertonicity impact her ability to maintain a comfortable, safe position in commercially available car seats  In addition, her increased tone affects her ability to use her hands in a functional way to grasp and play with toys, as well as bear weight through her hands  Lack of weight bearing through open palms will affect the development of Batool's hand arches and in turn affect her grasp prehension development  Skilled Occupational Therapy is recommended in order to address performance skills and goals as listed above  It is recommended that Batool receive outpatient OT (1 time/week) as needed to improve performance and independence in (ADLs, School, Home Environment, and [employer])   Understanding of Dx/Px/POC: good   Prognosis: good    Goals  Short term goals:  1  Procure an appropriate car seat as available or adapt Batool's current car seat to be functional within NHTSA guidelines within 3 visits  2   Procure Kandace thumb splints for Batool within 1 month  3   Batool will drink from an adaptive straw cup with no more than mod A 50% of given opportunities in 12 weeks  4   Batool will weight bear through BUE for at least 15 seconds with no more than mod A, 50% of given opportunities in 12 weeks  5   Batool will visually track a high contrast moving object horizontally at least 45 degrees past midline, 50% of given opportunities in 12 weeks  6   Carlean Buerger will consistently grasp presented textured objects following tactile input to hands independently at least 50% of given opportunities      Long term Sig Dispense Refill    hydrOXYzine (ATARAX) 25 MG tablet Take 4 tablets by mouth at bedtime as needed for Anxiety (Patient needs TEVA ). And sleep 368 tablet 0    Budeson-Glycopyrrol-Formoterol (Breztri Aerosphere) 160-9-4.8 MCG/ACT Aerosol Inhale 2 puffs into the lungs in the morning and 2 puffs in the evening. 32.1 g 1    albuterol (Ventolin HFA) 108 (90 Base) MCG/ACT inhaler Inhale 2 puffs into the lungs every 4 hours as needed for Shortness of Breath or Wheezing. 54 g 11    ipratropium-albuterol (DUONEB) 0.5-2.5 (3) MG/3ML nebulizer solution 3 ml per nebulizer three times daily as needed for wheezing or shortness of breath 360 mL 6    clopidogrel (PLAVIX) 75 MG tablet Take 1 tablet by mouth daily. 90 tablet 3    Aspirin Adult Low Strength 81 MG EC tablet TAKE ONE TABLET BY MOUTH ONCE DAILY 90 tablet 3    Fexofenadine HCl (MUCINEX ALLERGY PO)       atorvastatin (LIPITOR) 20 MG tablet Take 1 tablet by mouth nightly. 90 tablet 3    albuterol (VENTOLIN) (2.5 MG/3ML) 0.083% nebulizer solution Take 3 mLs by nebulization every 6 hours as needed for Wheezing or Shortness of Breath. 320 mL 11    Misc. Devices Kit Please dispense one quad cane 1 kit 0    nitroGLYCERIN (NITROSTAT) 0.4 MG sublingual tablet Place 1 tablet under the tongue every 5 minutes as needed for Chest pain. 25 tablet 2    Cholecalciferol (vitamin D) 50 mcg (2,000 units) capsule Take 1 capsule by mouth daily. 90 capsule 0    Cyanocobalamin (vitamin B-12) 500 MCG Tab Take 2 tablets by mouth daily. 90 tablet 0    folic acid (FOLATE) 1 MG tablet Take 5 mg by mouth daily.        No current facility-administered medications for this visit.       PHYSICAL EXAMINATION:  Vitals:    04/23/24 1341   BP: 118/59   Pulse: (!) 117   Resp: 16   Temp: 98 °F (36.7 °C)   TempSrc: Temporal   SpO2: 96%   Weight: 70.2 kg (154 lb 11.2 oz)   PainSc:  0   ECOG performance status 1  Patient is alert and oriented to time place and person, in no acute  distress.  Cranium: Normocephalic, atraumatic. No conjunctival pallor or icterus.   Psychiatric: Normal mood and affect. There is good understanding of the conversation and asks relevant questions.      LABORATORY DATA  WBC (K/mcL)   Date Value   03/27/2024 6.6     RBC (mil/mcL)   Date Value   03/27/2024 5.30 (H)     HCT (%)   Date Value   03/27/2024 34.5 (L)     HGB (g/dL)   Date Value   03/27/2024 9.9 (L)     PLT (K/mcL)   Date Value   03/27/2024 622 (H)       Sodium (mmol/L)   Date Value   03/27/2024 137     Potassium (mmol/L)   Date Value   03/27/2024 3.6     Chloride (mmol/L)   Date Value   03/27/2024 101     Glucose (mg/dL)   Date Value   03/27/2024 104 (H)     Calcium (mg/dL)   Date Value   03/27/2024 9.2     Carbon Dioxide (mmol/L)   Date Value   03/27/2024 25     BUN (mg/dL)   Date Value   03/27/2024 10     Creatinine (mg/dL)   Date Value   03/27/2024 0.86     Alkaline Phosphatase (Units/L)   Date Value   03/27/2024 176 (H)     GOT/AST (Units/L)   Date Value   03/27/2024 23     GPT/ALT (Units/L)   Date Value   03/27/2024 27       Admission on 04/18/2024, Discharged on 04/18/2024   Component Date Value Ref Range Status    Case Report 04/18/2024    Final                    Value:Non-Gynecolgical Cytology                         Case: [id number]                                  Authorizing Provider:  Costa Kapadia MD    Collected:           04/18/2024 1238              Ordering Location:     [facility]                Received:            04/18/2024 1342                                     Gastrointestinal (GI)                                                                               Center                                                                       Pathologist:           Mili Siu MD                                                            Specimen:    Lung, Right Upper Lobe, RUL lung FNA                                                       Cytologic Interpretation 04/18/2024     goals: 1  Procure appropriate DME and splints for Batool  2   Improve postural control and hand skills for increased independence in play      Plan  Patient would benefit from: custom splinting and skilled OT  Planned therapy interventions: activity modification, ADL training, massage, balance/weight bearing training, motor coordination training, neuromuscular re-education, orthotic fitting/training, patient education, postural training, community reintegration, sensory integrative techniques, strengthening, stretching, therapeutic activities, therapeutic exercise, home exercise program and fine motor coordination training  Other planned therapy interventions: kinesiotaping  Frequency: 1x week  Duration in visits: 12  Duration in weeks: 12  Treatment plan discussed with: caregiver and family Final                    Value:This result contains rich text formatting which cannot be displayed here.    Comment 04/18/2024    Final                    Value:This result contains rich text formatting which cannot be displayed here.    Clinical Information 04/18/2024    Final                    Value:This result contains rich text formatting which cannot be displayed here.    Gross Description 04/18/2024    Final                    Value:This result contains rich text formatting which cannot be displayed here.    Intraoperative Consultation 04/18/2024    Final                    Value:This result contains rich text formatting which cannot be displayed here.    Microscopic Description 04/18/2024    Final                    Value:This result contains rich text formatting which cannot be displayed here.    Disclaimer 04/18/2024    Final                    Value:This result contains rich text formatting which cannot be displayed here.    Case Report 04/18/2024    Final                    Value:Non-Gynecolgical Cytology                         Case: [id number]                                  Authorizing Provider:  Costa Kapadia MD    Collected:           04/18/2024 1321              Ordering Location:     [facility]                Received:            04/18/2024 1342                                     Gastrointestinal (GI)                                                                               Center                                                                       Pathologist:           Mili Siu MD                                                            Specimens:   A) - Lung, Right Upper Lobe, RUL Lung lavage                                                        B) - Lung, Right Upper Lobe, RUL lung brush                                                Cytologic Interpretation 04/18/2024    Final                    Value:This result contains rich text formatting which cannot be displayed  here.    Clinical Information 04/18/2024    Final                    Value:This result contains rich text formatting which cannot be displayed here.    Gross Description 04/18/2024    Final                    Value:This result contains rich text formatting which cannot be displayed here.    Microscopic Description 04/18/2024    Final                    Value:This result contains rich text formatting which cannot be displayed here.    Disclaimer 04/18/2024    Final                    Value:This result contains rich text formatting which cannot be displayed here.       IMAGING  XR CHEST AP OR PA    Result Date: 4/18/2024  Narrative: EXAM: XR CHEST AP OR PA DATE OF EXAM: 4/18/2024 1:39 PM. COMPARISON: 3/27/2024. CLINICAL INFORMATION: post bronch. FINDINGS: Heart is normal in size. Pulmonary vessels are normal. No effusion. No pneumothorax.     Impression: IMPRESSION: Negative. Electronically Signed by: Sadi Barroso MD Signed on: 4/18/2024 2:39 PM Created on Workstation ID: [id number] Signed on Workstation ID: [id number]    FL INTRAOPERATIVE C ARM NO REPORT    Result Date: 4/18/2024  Narrative: Findings from this exam can be found in operative or procedural report section.     CT CHEST WO CONTRAST    Result Date: 4/17/2024  Narrative: EXAM: CT CHEST WO CONTRAST INDICATIONS: Lung nodule R91.1 Solitary pulmonary nodule COMPARISON: 3/27/2024 TECHNIQUE: Multiple transaxial images of the chest were obtained without contrast material. This examination is suboptimal for the evaluation of solid organs and vascular structures due to the lack of intravenous contrast material. FINDINGS: Lung parenchyma: There has been no significant change in approximately 1.4 cm groundglass nodule in the medial right upper lobe. There are stable scattered minor nodularities and foci of linear scarring elsewhere. There is moderate emphysema. Thoracic aorta:  Normal. Pulmonary arteries: Normal. Heart and pericardium: There is moderate  coronary artery calcification. Hilum and mediastinum:  Normal. Axilla and supraclavicular regions:  There is no lymphadenopathy. Upper abdomen:  No acute finding. Osseous structures:  There is no acute bony abnormality. There is minor spondylosis. Subcutaneous tissues:  Normal.     Impression: IMPRESSION: Stable right middle lobe non-mass-like nodularity. There is mild to moderate emphysema. Recommend follow-up chest CT in 1 year to confirm stability. Electronically Signed by: Yaya Marshall MD Signed on: 4/17/2024 3:33 PM Created on Workstation ID: [id number] Signed on Workstation ID: [id number]    PET CT FDG SKULL BASE TO MID-THIGH INITIAL    Result Date: 3/28/2024  Narrative: 18F-FDG PET-CT ROUTINE EYES TO THIGHS HISTORY: Initial staging lung nodule. . COMPARISON: Prior CT chest most recent 2/26/2024 RADIOPHARMACEUTICAL: 12.3 mCi  [id number]-5-Umnmp-S-Glucose (FDG) IV PROCEDURE: Immediately prior to radionuclide injection, blood glucose was measured by fingerstick and was 119 mg/dL (Normal range =  mg/dL). 57 min after radionuclide administration, PET and helical CT images of the body from the base of the skull to the proximal thighs were obtained and reconstructed in the axial, coronal and sagittal views. Fusion images and a maximal intensity projection (MIP) image were also generated. FINDINGS:  Emphysema. Avid 1.4 x 1.3 cm part solid right upper lobe enlarging nodule with SUV max of 2.4. No pathologically avid mediastinal or hilar nodes. No suspicious avid extrathoracic lesions. Segmental intense right colonic activity. Although, more focal focus of avidity colonic hepatic flexure. No pathologic CT correlate. Mild low-grade thyroidal activity. Right neck musculature activity. Additional low-dose CT findings: Small sliding hiatal hernia. Coronary artery atherosclerosis/stents. Stable 0.5 cm nodularity along the right minor fissure. Gallbladder absent. Hysterectomy. 2.9 cm right ovarian cyst without avidity.  Appendectomy. Right hip arthroplasty. L5-S1 pars defect with mild anterolisthesis.     Impression: IMPRESSION: 1. Avid suspicious right upper lobe nodule. 2. No avid intrathoracic jacinto or extrathoracic metastasis. 3. Indeterminate colonic hepatic flexure avidity. More segmental right colonic activity is probably physiologic. Consider correlation with colonoscopy. Electronically Signed by: Leo Carmichael DO Signed on: 3/28/2024 12:22 PM Created on Workstation ID: [id number] Signed on Workstation ID: [id number]    CTA CHEST PULMONARY EMBOLISM    Result Date: 3/27/2024  Narrative: EXAM: CTA CHEST PULMONARY EMBOLISM DATE: 3/27/2024 6:39 PM INDICATIONS:    SOB, tachycardia COMPARISON: 2/26/2024. 2/23/2023 TECHNIQUE: Helical imaging was performed through the chest using a multidetector scanner during pulmonary arterial phase, following bolus intravenous injection of 75 mL nonionic Omnipaque 350 at 4 cc/sec. Axial, sagittal, and coronal reformats are provided, and bilateral coronal oblique MIP reconstructions are provided. FINDINGS: There is good contrast opacification of the pulmonary arteries. There is no evidence of central or eccentric filling defect. No evidence of pulmonary embolus. There is moderate to severe pan acinar emphysematous disease. No consolidation. No effusion. There is focal groundglass opacity involving the right upper lobe anteriorly measuring 15 mm. (4:74). This is more prominent compared to prior or new. There is some stable nodularity along the right minor fissure (4:85). There is no pleural effusion or pleural thickening. There is no mediastinal or hilar adenopathy based on sized criteria. The thoracic aorta demonstrates no aneurysm or significant disease. Imaging of the upper abdomen demonstrates no focal abnormality. [employer] RECOMMENDATIONS- PULMONARY NODULES 2017 --------------------------------------------------------------------------- -------------------------------- \"SUBSOLID\"  NODULES: Solitary             Pure GGNs                       < 6 mm         No routine followup required                        >/=  6 mm    CT at 6-12 months to confirm persistence, then CT                                             every 2 years until 5 years                                                      Part-Solid                                       <6 mm        No routine follow-up required                          >/= 6mm    CT at 3-6 months to confirm persistence.  If unchanged and solid                                             component remains < 6mm, annual CT for 5 years. Multiple                                     < 6 mm       CT at 3-6 months.  If stable, consider CT at 2 and 4 years.                                                                         >/= 6mm    CT at 3-6 months.  Subsequent management based on                                              the most suspicious nodule(s). *In certain suspicious nodules < 6mm, consider follow-up at 2 and 4 years. If solid component or growth develops, consider resection. *In practice, part-solid nodules cannot be defined as such until >/= 6mm, and nodules < 6mm do not usually require follow-up.  Persistent part-solid nodules with solid components >/= 6mm should be considered highly suspicious. *Multiple < 6mm pure ground glass nodules are usually benign, but consider follow-up in selected patients at high risk at 2 and 4 years. GGN = ground-glass nodule Requires 1 mm reconstructions for eval.  Always compare with baseline study. Guidelines do not apply to lung cancer screening, patients with immunosuppression, or patients with known primary cancer. --------------------------------------------------------------------------- -----------------------------------     Impression: IMPRESSION: 1. There is no evidence of pulmonary embolus. 2. There is no evidence of acute pulmonary disease. 3. Groundglass nodule right upper lobe anterior segment 14  mm. Follow-up as per Fleischner criteria listed above. Electronically Signed by: Sadi Thomason MD Signed on: 3/27/2024 7:41 PM Created on Workstation ID: [id number] Signed on Workstation ID: [id number]    XR CHEST PA OR AP 1 VIEW    Result Date: 3/27/2024  Narrative: EXAM: XR CHEST AP OR PA DATE: 3/27/2024 5:00 PM INDICATIONS:    Fever COMPARISON: 6/14/2023, 2/26/2024 FINDINGS: The cardiovascular silhouette and vasculature are normal. The lung volumes are upper normal. There is no effusion or consolidation. There is no pneumothorax.  The regional skeleton is unremarkable.     Impression: IMPRESSION: 1. Unremarkable single view of the chest. Electronically Signed by: Sadi Thomason MD Signed on: 3/27/2024 5:56 PM Created on Workstation ID: [id number] Signed on Workstation ID: [id number]      PATHOLOGY    ASSESSMENT   1. Increasing right upper lung nodule status post biopsy that is nondiagnostic, IR biopsy planned followed by thoracic tumor board discussion   2. Iron-deficiency with prior completion of GI workup.  IV iron therapy planned      PLAN  IR guided biopsy of the right upper lung nodule  Further management based on the results  IV iron therapy      PET CT scan from 3/27/2024 showed avid suspicious right upper lobe nodule, but the bronchoscopy and biopsy is non-diagnostic. Compared to previous scans done in 2023 the nodule has slightly increased in size.   Cytology of FNA from 4/8/2024 did not reveal malignancy. Biopsy was non-diagnostic, showing atypical cells.   Advised repeat biopsy with Interventional Radiology. Will put in a request for biopsy and consult with Interventional radiology regarding its feasibility.   If Interventional Radiology proceeds with biopsy and the results confirm cancer, we will consult with the surgeon regarding surgery. In case the patient is not a candidate for surgery, radiation therapy will be considered.   Will proceed with CT guided approach through Interventional  Radiology, if Interventional radiology is able to access the lesion. In case this is not possible, the patient's case will be presented in the Thoracic tumor board and lung cancer conference.  If a definitive diagnosis is not able to be established, then a short-term interval follow-up CT scan would be appropriate.  Surgical treatment for the lung nodule will require correction of anemia.     Encouraged to quit smoking.  Benefits of quitting smoking and risks of continued smoking discussed.  Strategies to successfully quit smoking discussed.  Patient related her difficulty with being able to quit in the past.  Reviewed and discussed previous blood labs and imaging.  Anemia is due to severely low levels of iron and likely not due to any bone marrow issues. Discussed treatment plan including alternate days 5 iron injections. Schedule discussed. Oral supplements are alternate options but not preferred due to the side effects and prolonged duration of treatment required and the need to correct anemia prior to any lung cancer treatment.     FOLLOW UP  1. IR lung nodule biopsy  2. RTC based on above -- [facility] to coordinate  3. Venofer/B12 x5 ASAP     Refer to orders.  Medical compliance with plan discussed and risks of non-compliance reviewed.  Patient education completed on disease process, etiology & prognosis.  Proper usage and side effects of medications reviewed & discussed.  Return to clinic as clinically indicated as discussed with patient who verbalized understanding of the plan and is in agreement with the plan.    [employer], have created a visit summary document based on the audio recording between Mckay Caldwell MD and this patient for the physician to review, edit as needed, and authenticate. Creation Date: 4/24/2024     I have reviewed and edited the visit summary above and attest that it is accurate.

## 2024-05-07 ENCOUNTER — OFFICE VISIT (OUTPATIENT)
Dept: PRIMARY CARE | Facility: CLINIC | Age: 69
End: 2024-05-07
Payer: MEDICARE

## 2024-05-07 VITALS
DIASTOLIC BLOOD PRESSURE: 84 MMHG | WEIGHT: 204 LBS | BODY MASS INDEX: 40.05 KG/M2 | TEMPERATURE: 97.9 F | RESPIRATION RATE: 14 BRPM | SYSTOLIC BLOOD PRESSURE: 132 MMHG | HEIGHT: 60 IN | OXYGEN SATURATION: 97 % | HEART RATE: 69 BPM

## 2024-05-07 DIAGNOSIS — I10 ESSENTIAL HYPERTENSION: ICD-10-CM

## 2024-05-07 DIAGNOSIS — K21.9 GASTRO-ESOPHAGEAL REFLUX DISEASE WITHOUT ESOPHAGITIS: ICD-10-CM

## 2024-05-07 DIAGNOSIS — Z12.31 ENCOUNTER FOR SCREENING MAMMOGRAM FOR BREAST CANCER: ICD-10-CM

## 2024-05-07 DIAGNOSIS — F33.1 MODERATE EPISODE OF RECURRENT MAJOR DEPRESSIVE DISORDER (MULTI): ICD-10-CM

## 2024-05-07 DIAGNOSIS — E78.2 MIXED HYPERLIPIDEMIA: ICD-10-CM

## 2024-05-07 DIAGNOSIS — E66.01 CLASS 2 SEVERE OBESITY DUE TO EXCESS CALORIES WITH SERIOUS COMORBIDITY AND BODY MASS INDEX (BMI) OF 39.0 TO 39.9 IN ADULT (MULTI): ICD-10-CM

## 2024-05-07 DIAGNOSIS — Z00.00 ROUTINE GENERAL MEDICAL EXAMINATION AT HEALTH CARE FACILITY: Primary | ICD-10-CM

## 2024-05-07 DIAGNOSIS — E87.6 HYPOKALEMIA: ICD-10-CM

## 2024-05-07 PROCEDURE — 3079F DIAST BP 80-89 MM HG: CPT | Performed by: FAMILY MEDICINE

## 2024-05-07 PROCEDURE — 1036F TOBACCO NON-USER: CPT | Performed by: FAMILY MEDICINE

## 2024-05-07 PROCEDURE — 99214 OFFICE O/P EST MOD 30 MIN: CPT | Performed by: FAMILY MEDICINE

## 2024-05-07 PROCEDURE — 1159F MED LIST DOCD IN RCRD: CPT | Performed by: FAMILY MEDICINE

## 2024-05-07 PROCEDURE — 1123F ACP DISCUSS/DSCN MKR DOCD: CPT | Performed by: FAMILY MEDICINE

## 2024-05-07 PROCEDURE — 3008F BODY MASS INDEX DOCD: CPT | Performed by: FAMILY MEDICINE

## 2024-05-07 PROCEDURE — 3074F SYST BP LT 130 MM HG: CPT | Performed by: FAMILY MEDICINE

## 2024-05-07 PROCEDURE — 1160F RVW MEDS BY RX/DR IN RCRD: CPT | Performed by: FAMILY MEDICINE

## 2024-05-07 PROCEDURE — 1170F FXNL STATUS ASSESSED: CPT | Performed by: FAMILY MEDICINE

## 2024-05-07 PROCEDURE — G0439 PPPS, SUBSEQ VISIT: HCPCS | Performed by: FAMILY MEDICINE

## 2024-05-07 RX ORDER — SIMVASTATIN 20 MG/1
20 TABLET, FILM COATED ORAL NIGHTLY
Qty: 30 TABLET | Refills: 3 | Status: SHIPPED | OUTPATIENT
Start: 2024-05-07

## 2024-05-07 RX ORDER — AMLODIPINE BESYLATE 5 MG/1
5 TABLET ORAL DAILY
Qty: 30 TABLET | Refills: 3 | Status: SHIPPED | OUTPATIENT
Start: 2024-05-07

## 2024-05-07 RX ORDER — POTASSIUM CHLORIDE 750 MG/1
10 TABLET, FILM COATED, EXTENDED RELEASE ORAL 2 TIMES DAILY
Qty: 60 TABLET | Refills: 2 | Status: SHIPPED | OUTPATIENT
Start: 2024-05-07

## 2024-05-07 RX ORDER — PANTOPRAZOLE SODIUM 40 MG/1
40 TABLET, DELAYED RELEASE ORAL DAILY
Qty: 30 TABLET | Refills: 3 | Status: SHIPPED | OUTPATIENT
Start: 2024-05-07

## 2024-05-07 RX ORDER — METOPROLOL SUCCINATE 100 MG/1
100 TABLET, EXTENDED RELEASE ORAL DAILY
Qty: 30 TABLET | Refills: 3 | Status: SHIPPED | OUTPATIENT
Start: 2024-05-07

## 2024-05-07 RX ORDER — LISINOPRIL AND HYDROCHLOROTHIAZIDE 12.5; 2 MG/1; MG/1
1 TABLET ORAL 2 TIMES DAILY
Qty: 60 TABLET | Refills: 3 | Status: SHIPPED | OUTPATIENT
Start: 2024-05-07

## 2024-05-07 ASSESSMENT — ENCOUNTER SYMPTOMS
WHEEZING: 0
DIARRHEA: 0
FEVER: 0
DIZZINESS: 0
NUMBNESS: 0
CONSTIPATION: 0
FREQUENCY: 0
VOMITING: 0
PALPITATIONS: 0
CHILLS: 0
HEMATURIA: 0
SHORTNESS OF BREATH: 0
TREMORS: 0
RHINORRHEA: 0
DYSURIA: 0
COUGH: 0
SORE THROAT: 0
HEADACHES: 0
ABDOMINAL PAIN: 0

## 2024-05-07 ASSESSMENT — ACTIVITIES OF DAILY LIVING (ADL)
DOING_HOUSEWORK: INDEPENDENT
GROCERY_SHOPPING: INDEPENDENT
MANAGING_FINANCES: INDEPENDENT
BATHING: INDEPENDENT
TAKING_MEDICATION: INDEPENDENT
DRESSING: INDEPENDENT

## 2024-05-07 NOTE — PROGRESS NOTES
Chief Complaint   Patient presents with    Medicare Annual Wellness Visit Subsequent    Follow-up     Hypertension and Hyperlipidemia    ER Follow-up     4/19/2024 COVID       Subjective   Patient ID: Ragini Santos is a 69 y.o. female who presents for Medicare Annual Wellness Visit Subsequent, Follow-up (Hypertension and Hyperlipidemia), and ER Follow-up (4/19/2024 COVID).    She presents for Annual Medicare Wellness Visit and follow-up on hypertension and hyperlipidemia. She has no chest pain, dyspnea, exertional chest pressure/discomfort, near-syncope, orthopnea, palpitations, paroxysmal nocturnal dyspnea, and syncope. Taking her medication regularly with no side effects.    She was in the ER on 4/19/2024 for COVID. Her symptoms have completely resolved. She has no shortness of breath, chest pain, cough, wheezing or fever.     Past Medical, Surgical, and Family History reviewed and updated in chart.    Reviewed all medications by prescribing practitioner or clinical pharmacist (such as prescriptions, OTCs, herbal therapies and supplements) and documented in the medical record.    Patient Self Assessment of Health Status  Patient Self Assessment: Good    Nutrition and Exercise  Current Diet: Well Balanced Diet  Adequate Fluid Intake: Yes  Caffeine: Yes  Exercise Frequency: Infrequently    Functional Ability/Level of Safety  Cognitive Impairment Observed: No cognitive impairment observed  Cognitive Impairment Reported: No cognitive impairment reported by patient or family    Home Safety Risk Factors: None    Review of Systems   Constitutional:  Negative for chills and fever.   HENT:  Negative for congestion, ear pain, nosebleeds, rhinorrhea and sore throat.    Respiratory:  Negative for cough, shortness of breath and wheezing.    Cardiovascular:  Negative for chest pain, palpitations and leg swelling.   Gastrointestinal:  Negative for abdominal pain, constipation, diarrhea and vomiting.   Genitourinary:  Negative  for dysuria, frequency and hematuria.   Neurological:  Negative for dizziness, tremors, numbness and headaches.       Objective   /84   Pulse 69   Temp 36.6 °C (97.9 °F)   Resp 14   Ht 1.524 m (5')   Wt 92.5 kg (204 lb)   SpO2 97%   BMI 39.84 kg/m²     Physical Exam  Constitutional:       General: She is not in acute distress.     Appearance: Normal appearance.   HENT:      Head: Normocephalic and atraumatic.      Mouth/Throat:      Mouth: Mucous membranes are moist.      Pharynx: Oropharynx is clear. No oropharyngeal exudate or posterior oropharyngeal erythema.   Eyes:      General: No scleral icterus.     Extraocular Movements: Extraocular movements intact.      Pupils: Pupils are equal, round, and reactive to light.   Cardiovascular:      Rate and Rhythm: Normal rate and regular rhythm.      Pulses: Normal pulses.      Heart sounds: No murmur heard.     No friction rub. No gallop.   Pulmonary:      Effort: Pulmonary effort is normal.      Breath sounds: No wheezing, rhonchi or rales.   Skin:     General: Skin is warm.      Coloration: Skin is not jaundiced or pale.      Findings: No erythema or rash.   Neurological:      General: No focal deficit present.      Mental Status: She is alert and oriented to person, place, and time.      Cranial Nerves: No cranial nerve deficit.      Sensory: No sensory deficit.      Coordination: Coordination normal.      Gait: Gait normal.       Admission on 04/19/2024, Discharged on 04/19/2024   Component Date Value Ref Range Status    Ventricular Rate 04/19/2024 86  BPM Final    Atrial Rate 04/19/2024 86  BPM Final    MI Interval 04/19/2024 182  ms Final    QRS Duration 04/19/2024 86  ms Final    QT Interval 04/19/2024 386  ms Final    QTC Calculation(Bazett) 04/19/2024 461  ms Final    P Axis 04/19/2024 42  degrees Final    R Axis 04/19/2024 -44  degrees Final    T Axis 04/19/2024 54  degrees Final    QRS Count 04/19/2024 14  beats Final    Q Onset 04/19/2024 216  ms  Final    P Onset 04/19/2024 125  ms Final    P Offset 04/19/2024 175  ms Final    T Offset 04/19/2024 409  ms Final    QTC Fredericia 04/19/2024 435  ms Final    WBC 04/19/2024 7.1  4.4 - 11.3 x10*3/uL Final    nRBC 04/19/2024 0.0  0.0 - 0.0 /100 WBCs Final    RBC 04/19/2024 4.99  4.00 - 5.20 x10*6/uL Final    Hemoglobin 04/19/2024 15.6  12.0 - 16.0 g/dL Final    Hematocrit 04/19/2024 46.1 (H)  36.0 - 46.0 % Final    MCV 04/19/2024 92  80 - 100 fL Final    MCH 04/19/2024 31.3  26.0 - 34.0 pg Final    MCHC 04/19/2024 33.8  32.0 - 36.0 g/dL Final    RDW 04/19/2024 12.6  11.5 - 14.5 % Final    Platelets 04/19/2024 249  150 - 450 x10*3/uL Final    Neutrophils % 04/19/2024 72.5  40.0 - 80.0 % Final    Immature Granulocytes %, Automated 04/19/2024 0.1  0.0 - 0.9 % Final    Immature Granulocyte Count (IG) includes promyelocytes, myelocytes and metamyelocytes but does not include bands. Percent differential counts (%) should be interpreted in the context of the absolute cell counts (cells/UL).    Lymphocytes % 04/19/2024 13.9  13.0 - 44.0 % Final    Monocytes % 04/19/2024 11.2  2.0 - 10.0 % Final    Eosinophils % 04/19/2024 2.0  0.0 - 6.0 % Final    Basophils % 04/19/2024 0.3  0.0 - 2.0 % Final    Neutrophils Absolute 04/19/2024 5.16  1.20 - 7.70 x10*3/uL Final    Percent differential counts (%) should be interpreted in the context of the absolute cell counts (cells/uL).    Immature Granulocytes Absolute, Au* 04/19/2024 0.01  0.00 - 0.70 x10*3/uL Final    Lymphocytes Absolute 04/19/2024 0.99 (L)  1.20 - 4.80 x10*3/uL Final    Monocytes Absolute 04/19/2024 0.80  0.10 - 1.00 x10*3/uL Final    Eosinophils Absolute 04/19/2024 0.14  0.00 - 0.70 x10*3/uL Final    Basophils Absolute 04/19/2024 0.02  0.00 - 0.10 x10*3/uL Final    Magnesium 04/19/2024 2.13  1.60 - 2.40 mg/dL Final    Glucose 04/19/2024 132 (H)  74 - 99 mg/dL Final    Sodium 04/19/2024 138  136 - 145 mmol/L Final    Potassium 04/19/2024 3.3 (L)  3.5 - 5.3 mmol/L  Final    Chloride 04/19/2024 102  98 - 107 mmol/L Final    Bicarbonate 04/19/2024 25  21 - 32 mmol/L Final    Anion Gap 04/19/2024 14  10 - 20 mmol/L Final    Urea Nitrogen 04/19/2024 30 (H)  6 - 23 mg/dL Final    Creatinine 04/19/2024 1.63 (H)  0.50 - 1.05 mg/dL Final    eGFR 04/19/2024 34 (L)  >60 mL/min/1.73m*2 Final    Calculations of estimated GFR are performed using the 2021 CKD-EPI Study Refit equation without the race variable for the IDMS-Traceable creatinine methods.  https://jasn.asnjournals.org/content/early/2021/09/22/ASN.9064498281    Calcium 04/19/2024 9.0  8.6 - 10.3 mg/dL Final    Albumin 04/19/2024 3.9  3.4 - 5.0 g/dL Final    Alkaline Phosphatase 04/19/2024 84  33 - 136 U/L Final    Total Protein 04/19/2024 7.2  6.4 - 8.2 g/dL Final    AST 04/19/2024 25  9 - 39 U/L Final    Bilirubin, Total 04/19/2024 0.7  0.0 - 1.2 mg/dL Final    ALT 04/19/2024 20  7 - 45 U/L Final    Patients treated with Sulfasalazine may generate falsely decreased results for ALT.    Lipase 04/19/2024 51  9 - 82 U/L Final    Lactate 04/19/2024 2.7 (H)  0.4 - 2.0 mmol/L Final    Troponin I, High Sensitivity 04/19/2024 11  0 - 13 ng/L Final    Flu A Result 04/19/2024 Not Detected  Not Detected Final    Flu B Result 04/19/2024 Not Detected  Not Detected Final    Coronavirus 2019, PCR 04/19/2024 Detected (A)  Not Detected Final    Color, Urine 04/19/2024 Lynn (N)  Straw, Yellow Final    Appearance, Urine 04/19/2024 Hazy (N)  Clear Final    Specific Gravity, Urine 04/19/2024 1.021  1.005 - 1.035 Final    pH, Urine 04/19/2024 5.0  5.0, 5.5, 6.0, 6.5, 7.0, 7.5, 8.0 Final    Protein, Urine 04/19/2024 30 (1+) (N)  NEGATIVE mg/dL Final    Glucose, Urine 04/19/2024 NEGATIVE  NEGATIVE mg/dL Final    Blood, Urine 04/19/2024 NEGATIVE  NEGATIVE Final    Ketones, Urine 04/19/2024 5 (TRACE) (A)  NEGATIVE mg/dL Final    Bilirubin, Urine 04/19/2024 NEGATIVE  NEGATIVE Final    Urobilinogen, Urine 04/19/2024 <2.0  <2.0 mg/dL Final    Nitrite,  "Urine 04/19/2024 NEGATIVE  NEGATIVE Final    Leukocyte Esterase, Urine 04/19/2024 TRACE (A)  NEGATIVE Final    Extra Tube 04/19/2024 Hold for add-ons.   Final    Auto resulted.    Extra Tube 04/19/2024 Hold for add-ons.   Final    Auto resulted.    Extra Tube 04/19/2024 Hold for add-ons.   Final    Auto resulted.    Extra Tube 04/19/2024 Hold for add-ons.   Final    Auto resulted.    Lactate 04/19/2024 1.0  0.4 - 2.0 mmol/L Final    WBC, Urine 04/19/2024 6-10 (A)  1-5, NONE /HPF Final    RBC, Urine 04/19/2024 1-2  NONE, 1-2, 3-5 /HPF Final    Squamous Epithelial Cells, Urine 04/19/2024 10-25 (FEW)  Reference range not established. /HPF Final    Bacteria, Urine 04/19/2024 4+ (A)  NONE SEEN /HPF Final    Hyaline Casts, Urine 04/19/2024 3+ (A)  NONE /LPF Final    Urine Culture 04/19/2024 Multiple organisms present, probable contamination. Repeat culture if clinically indicated. (A)   Final        Assessment/Plan   Problem List Items Addressed This Visit       Class 2 severe obesity due to excess calories with serious comorbidity and body mass index (BMI) of 39.0 to 39.9 in adult (Multi)     Continue decrease calorie diet and not more than 1500 calorie per day diet and low-fat diet.  Continue with regular exercise program.  We advised exercise at least 5 days a week for at least 45 minutes and also a minimum of 10,000 steps a day.  The detrimental effects of obesity on health were discussed.         Essential hypertension     Well-controlled, continue current medications and management.  Dietary sodium restriction.  Regular aerobic exercise program is recommended to help achieve and maintain normal body weight, fitness and improve lipid balance. .  Dietary changes: Increase soluble fiber  Plant sterols 2grams per day (e.g. Benecol)  Reduce saturated fat, \"trans\" monounsaturated fatty acids, and cholesterol         Relevant Medications    metoprolol succinate XL (Toprol XL) 100 mg 24 hr tablet    amLODIPine (Norvasc) 5 " mg tablet    lisinopriL-hydrochlorothiazide 20-12.5 mg tablet    Other Relevant Orders    CBC and Auto Differential    Comprehensive Metabolic Panel    Lipid Panel    Follow Up In [facility] - PCP - Established    Gastro-esophageal reflux disease without esophagitis     Well-controlled, continue current medications and management.         Relevant Medications    pantoprazole (ProtoNix) 40 mg EC tablet    Hypokalemia     We will order labs to recheck her Potassium Level.         Relevant Medications    potassium chloride CR 10 mEq ER tablet    Mixed hyperlipidemia     The nature of cardiac risk has been fully discussed with this patient. Discussed cardiovascular risk analysis and appropriate diet with the need for lifelong measures to reduce the risk. A regular exercise program is recommended to help achieve and maintain normal body weight, fitness and improve lipid balance. Patient education provided. They understand and agree with this course of treatment. They will return with new or worsening symptoms. Patient instructed to remain current with appropriate annual health maintenance.          Relevant Medications    simvastatin (Zocor) 20 mg tablet    Other Relevant Orders    CBC and Auto Differential    Comprehensive Metabolic Panel    Lipid Panel    Follow Up In [facility] - PCP - Established    Moderate episode of recurrent major depressive disorder (Multi)     Chronic Condition Documentation : Stable based on symptoms and exam.  Continue established treatment plan and follow-up at least yearly.         Routine general medical examination at health care facility - Primary     Recommend low-cholesterol diet, low-fat diet and low-salt diet.  The need for lifelong dietary compliance in order to reduce cardiac risk is recommended.  We will also recommend regular exercise program to improve lipid balance and overall health.  Recommend decreasing fat and cholesterol in diet, increasing aerobic  exercise with a goal of 4 or more days per week          Other Visit Diagnoses       Encounter for screening mammogram for breast cancer        Relevant Orders    BI mammo bilateral screening tomosynthesis          Scribe Attestation  By signing my name below, I, Lang Arana   attest that this documentation has been prepared under the direction and in the presence of Michael Gonzalez MD.    Patient was identified as a fall risk. Risk prevention instructions provided.

## 2024-05-07 NOTE — PATIENT INSTRUCTIONS
BMI was above normal measurement. Current weight: 92.5 kg (204 lb)  Weight change since last visit (-) denotes wt loss 9 lbs   Weight loss needed to achieve BMI 25: 76.3 Lbs  Weight loss needed to achieve BMI 30: 50.7 Lbs  Advised to Increase physical activity.        Ways to Help Prevent Falls at Home    Quick Tips   ? Ask for help if you need it. Most people want to help!   ? Get up slowly after sitting or laying down   ? Wear a medical alert device or keep cell phone in your pocket   ? Use night lights, especially areas near a bathroom   ? Keep the items you use often within reach on a small stool or end table   ? Use an assistive device such as walker or cane, as directed by provider/physical therapy   ? Use a non-slip mat and grab bars in your bathroom. Look for home health sections for best options     Other Areas to Focus On   ? Exercise and nutrition: Regular exercise or taking a falls prevention class are great ways improve strength and balance. Don’t forget to stay hydrated and bring a snack!   ? Medicine side effects: Some medicines can make you sleepy or dizzy, which could cause a fall. Ask your healthcare provider about the side effects your medicines could cause. Be sure to let them know if you take any vitamins or supplements as well.   ? Tripping hazards: Remove items you could trip on, such as loose mats, rugs, cords, and clutter. Wear closed toe shoes with rubber soles.   ? Health and wellness: Get regular checkups with your healthcare provider, plus routine vision and hearing screenings. Talk with your healthcare provider about:   o Your medicines and the possible side effects - bring them in a bag if that is easier!   o Problems with balance or feeling dizzy   o Ways to promote bone health, such as Vitamin D and calcium supplements   o Questions or concerns about falling     *Ask your healthcare team if you have questions     [facility], 2022

## 2024-05-08 ENCOUNTER — LAB (OUTPATIENT)
Dept: LAB | Facility: LAB | Age: 69
End: 2024-05-08
Payer: MEDICARE

## 2024-05-08 DIAGNOSIS — E78.2 MIXED HYPERLIPIDEMIA: ICD-10-CM

## 2024-05-08 DIAGNOSIS — I10 ESSENTIAL HYPERTENSION: ICD-10-CM

## 2024-05-08 LAB
ALBUMIN SERPL BCP-MCNC: 3.9 G/DL (ref 3.4–5)
ALP SERPL-CCNC: 114 U/L (ref 33–136)
ALT SERPL W P-5'-P-CCNC: 15 U/L (ref 7–45)
ANION GAP SERPL CALC-SCNC: 10 MMOL/L (ref 10–20)
AST SERPL W P-5'-P-CCNC: 17 U/L (ref 9–39)
BASOPHILS # BLD AUTO: 0.03 X10*3/UL (ref 0–0.1)
BASOPHILS NFR BLD AUTO: 0.6 %
BILIRUB SERPL-MCNC: 0.7 MG/DL (ref 0–1.2)
BUN SERPL-MCNC: 10 MG/DL (ref 6–23)
CALCIUM SERPL-MCNC: 8.9 MG/DL (ref 8.6–10.3)
CHLORIDE SERPL-SCNC: 104 MMOL/L (ref 98–107)
CHOLEST SERPL-MCNC: 202 MG/DL (ref 0–199)
CHOLESTEROL/HDL RATIO: 3
CO2 SERPL-SCNC: 30 MMOL/L (ref 21–32)
CREAT SERPL-MCNC: 0.85 MG/DL (ref 0.5–1.05)
EGFRCR SERPLBLD CKD-EPI 2021: 73 ML/MIN/1.73M*2
EOSINOPHIL # BLD AUTO: 0.1 X10*3/UL (ref 0–0.4)
EOSINOPHIL NFR BLD AUTO: 2 %
ERYTHROCYTE [DISTWIDTH] IN BLOOD BY AUTOMATED COUNT: 12.9 % (ref 11.5–14.5)
GLUCOSE SERPL-MCNC: 111 MG/DL (ref 74–99)
HCT VFR BLD AUTO: 43.4 % (ref 36–46)
HDLC SERPL-MCNC: 67.6 MG/DL
HGB BLD-MCNC: 13.9 G/DL (ref 12–16)
IMM GRANULOCYTES # BLD AUTO: 0.01 X10*3/UL (ref 0–0.5)
IMM GRANULOCYTES NFR BLD AUTO: 0.2 % (ref 0–0.9)
LDLC SERPL CALC-MCNC: 104 MG/DL
LYMPHOCYTES # BLD AUTO: 1.02 X10*3/UL (ref 0.8–3)
LYMPHOCYTES NFR BLD AUTO: 20.4 %
MCH RBC QN AUTO: 30.4 PG (ref 26–34)
MCHC RBC AUTO-ENTMCNC: 32 G/DL (ref 32–36)
MCV RBC AUTO: 95 FL (ref 80–100)
MONOCYTES # BLD AUTO: 0.51 X10*3/UL (ref 0.05–0.8)
MONOCYTES NFR BLD AUTO: 10.2 %
NEUTROPHILS # BLD AUTO: 3.33 X10*3/UL (ref 1.6–5.5)
NEUTROPHILS NFR BLD AUTO: 66.6 %
NON HDL CHOLESTEROL: 134 MG/DL (ref 0–149)
NRBC BLD-RTO: 0 /100 WBCS (ref 0–0)
PLATELET # BLD AUTO: 269 X10*3/UL (ref 150–450)
POTASSIUM SERPL-SCNC: 3.9 MMOL/L (ref 3.5–5.3)
PROT SERPL-MCNC: 6.5 G/DL (ref 6.4–8.2)
RBC # BLD AUTO: 4.57 X10*6/UL (ref 4–5.2)
SODIUM SERPL-SCNC: 140 MMOL/L (ref 136–145)
TRIGL SERPL-MCNC: 150 MG/DL (ref 0–149)
VLDL: 30 MG/DL (ref 0–40)
WBC # BLD AUTO: 5 X10*3/UL (ref 4.4–11.3)

## 2024-05-08 PROCEDURE — 80053 COMPREHEN METABOLIC PANEL: CPT

## 2024-05-08 PROCEDURE — 85025 COMPLETE CBC W/AUTO DIFF WBC: CPT

## 2024-05-08 PROCEDURE — 80061 LIPID PANEL: CPT

## 2024-05-08 PROCEDURE — 36415 COLL VENOUS BLD VENIPUNCTURE: CPT

## 2024-05-09 DIAGNOSIS — B00.1 COLD SORE: ICD-10-CM

## 2024-05-09 DIAGNOSIS — K21.9 GASTRO-ESOPHAGEAL REFLUX DISEASE WITHOUT ESOPHAGITIS: ICD-10-CM

## 2024-05-09 DIAGNOSIS — G89.4 CHRONIC PAIN SYNDROME: ICD-10-CM

## 2024-05-09 DIAGNOSIS — I10 ESSENTIAL HYPERTENSION: ICD-10-CM

## 2024-05-09 DIAGNOSIS — M47.817 LUMBOSACRAL SPONDYLOSIS WITHOUT MYELOPATHY: ICD-10-CM

## 2024-05-09 DIAGNOSIS — M47.16 LUMBAR SPONDYLOSIS WITH MYELOPATHY: ICD-10-CM

## 2024-05-09 DIAGNOSIS — E78.2 MIXED HYPERLIPIDEMIA: ICD-10-CM

## 2024-05-09 RX ORDER — VALACYCLOVIR HYDROCHLORIDE 1 G/1
TABLET, FILM COATED ORAL
Qty: 30 TABLET | Refills: 1 | Status: SHIPPED | OUTPATIENT
Start: 2024-05-09

## 2024-05-09 RX ORDER — TIZANIDINE 4 MG/1
4 TABLET ORAL NIGHTLY PRN
Qty: 30 TABLET | Refills: 0 | Status: SHIPPED | OUTPATIENT
Start: 2024-05-09

## 2024-05-09 RX ORDER — TRAMADOL HYDROCHLORIDE 50 MG/1
TABLET ORAL
Qty: 60 TABLET | Refills: 0 | OUTPATIENT
Start: 2024-05-09

## 2024-05-10 DIAGNOSIS — E78.2 MIXED HYPERLIPIDEMIA: Primary | ICD-10-CM

## 2024-05-15 ENCOUNTER — OFFICE VISIT (OUTPATIENT)
Dept: PAIN MANAGEMENT | Age: 69
End: 2024-05-15
Payer: MEDICARE

## 2024-05-15 VITALS
DIASTOLIC BLOOD PRESSURE: 84 MMHG | SYSTOLIC BLOOD PRESSURE: 130 MMHG | BODY MASS INDEX: 39.27 KG/M2 | HEIGHT: 60 IN | WEIGHT: 200 LBS

## 2024-05-15 DIAGNOSIS — M46.1 SACROILIITIS (HCC): Primary | ICD-10-CM

## 2024-05-15 DIAGNOSIS — M47.817 LUMBOSACRAL SPONDYLOSIS WITHOUT MYELOPATHY: ICD-10-CM

## 2024-05-15 PROCEDURE — 1036F TOBACCO NON-USER: CPT | Performed by: NURSE PRACTITIONER

## 2024-05-15 PROCEDURE — 1090F PRES/ABSN URINE INCON ASSESS: CPT | Performed by: NURSE PRACTITIONER

## 2024-05-15 PROCEDURE — 99214 OFFICE O/P EST MOD 30 MIN: CPT | Performed by: NURSE PRACTITIONER

## 2024-05-15 PROCEDURE — 3079F DIAST BP 80-89 MM HG: CPT | Performed by: NURSE PRACTITIONER

## 2024-05-15 PROCEDURE — G8399 PT W/DXA RESULTS DOCUMENT: HCPCS | Performed by: NURSE PRACTITIONER

## 2024-05-15 PROCEDURE — G8417 CALC BMI ABV UP PARAM F/U: HCPCS | Performed by: NURSE PRACTITIONER

## 2024-05-15 PROCEDURE — 1123F ACP DISCUSS/DSCN MKR DOCD: CPT | Performed by: NURSE PRACTITIONER

## 2024-05-15 PROCEDURE — G8427 DOCREV CUR MEDS BY ELIG CLIN: HCPCS | Performed by: NURSE PRACTITIONER

## 2024-05-15 PROCEDURE — 3017F COLORECTAL CA SCREEN DOC REV: CPT | Performed by: NURSE PRACTITIONER

## 2024-05-15 PROCEDURE — 3075F SYST BP GE 130 - 139MM HG: CPT | Performed by: NURSE PRACTITIONER

## 2024-05-15 RX ORDER — PAROXETINE HYDROCHLORIDE 20 MG/1
TABLET, FILM COATED ORAL
COMMUNITY
Start: 2024-05-09

## 2024-05-15 RX ORDER — TIZANIDINE 4 MG/1
4 TABLET ORAL NIGHTLY PRN
COMMUNITY
Start: 2024-05-09

## 2024-05-15 RX ORDER — FUROSEMIDE 20 MG/1
20 TABLET ORAL DAILY
COMMUNITY
Start: 2020-08-09

## 2024-05-15 RX ORDER — PANTOPRAZOLE SODIUM 40 MG/1
40 TABLET, DELAYED RELEASE ORAL DAILY
COMMUNITY
Start: 2024-05-07

## 2024-05-15 RX ORDER — TRAMADOL HYDROCHLORIDE 50 MG/1
50 TABLET ORAL 2 TIMES DAILY PRN
Qty: 30 TABLET | Refills: 0 | Status: SHIPPED | OUTPATIENT
Start: 2024-05-15 | End: 2024-05-30

## 2024-05-15 ASSESSMENT — ENCOUNTER SYMPTOMS
DIARRHEA: 0
TROUBLE SWALLOWING: 0
GASTROINTESTINAL NEGATIVE: 1
EYES NEGATIVE: 1
CONSTIPATION: 0
BACK PAIN: 1

## 2024-05-15 NOTE — PROGRESS NOTES
Viki Jerome  (1955)    5/15/2024    Subjective:     Viki Jerome is 69 y.o. female who complains today of:    Chief Complaint   Patient presents with    Follow-up     [place]- lower back started hurting again.    Back Pain     Lower          Allergies:  Patient has no known allergies.    Past Medical History:   Diagnosis Date    Anxiety     Cancer (HCC)     skin cancer    Depression     Diverticulitis     Hyperlipidemia     Hypertension     Obesity     Precordial pain 10/9/2018    Urinary incontinence      Past Surgical History:   Procedure Laterality Date    COLONOSCOPY      HYSTERECTOMY (CERVIX STATUS UNKNOWN)      SKIN CANCER EXCISION      forehead    TUBAL LIGATION  1976    UPPER GASTROINTESTINAL ENDOSCOPY  2016    w/bx      Family History   Problem Relation Age of Onset    Stroke Mother     COPD Mother     High Blood Pressure Father     Heart Disease Father     High Blood Pressure Sister     Kidney Disease Sister     High Blood Pressure Brother     High Blood Pressure Sister     High Blood Pressure Sister      Social History     Socioeconomic History    Marital status:      Spouse name: Not on file    Number of children: Not on file    Years of education: Not on file    Highest education level: Not on file   Occupational History    Not on file   Tobacco Use    Smoking status: Former     Current packs/day: 0.00     Types: Cigarettes     Start date: 1971     Quit date: 10/25/1971     Years since quittin.5    Smokeless tobacco: Never    Tobacco comments:     extremely light smoker only for a short time   Substance and Sexual Activity    Alcohol use: No    Drug use: No    Sexual activity: Not on file   Other Topics Concern    Not on file   Social History Narrative    Not on file     Social Determinants of Health     Financial Resource Strain: Not on file   Food Insecurity: Not on file   Transportation Needs: Not on file

## 2024-05-16 ENCOUNTER — HOSPITAL ENCOUNTER (OUTPATIENT)
Dept: RADIOLOGY | Facility: HOSPITAL | Age: 69
Discharge: HOME | End: 2024-05-16
Payer: MEDICARE

## 2024-05-16 ENCOUNTER — TELEPHONE (OUTPATIENT)
Dept: PAIN MANAGEMENT | Age: 69
End: 2024-05-16

## 2024-05-16 VITALS — WEIGHT: 200 LBS | HEIGHT: 60 IN | BODY MASS INDEX: 39.27 KG/M2

## 2024-05-16 DIAGNOSIS — M47.817 SPONDYLOSIS WITHOUT MYELOPATHY OR RADICULOPATHY, LUMBOSACRAL REGION: ICD-10-CM

## 2024-05-16 DIAGNOSIS — M46.1 SACROILIITIS, NOT ELSEWHERE CLASSIFIED (CMS-HCC): ICD-10-CM

## 2024-05-16 DIAGNOSIS — Z12.31 ENCOUNTER FOR SCREENING MAMMOGRAM FOR BREAST CANCER: ICD-10-CM

## 2024-05-16 PROCEDURE — 77067 SCR MAMMO BI INCL CAD: CPT | Performed by: RADIOLOGY

## 2024-05-16 PROCEDURE — 72100 X-RAY EXAM L-S SPINE 2/3 VWS: CPT

## 2024-05-16 PROCEDURE — 72100 X-RAY EXAM L-S SPINE 2/3 VWS: CPT | Performed by: STUDENT IN AN ORGANIZED HEALTH CARE EDUCATION/TRAINING PROGRAM

## 2024-05-16 PROCEDURE — 77067 SCR MAMMO BI INCL CAD: CPT

## 2024-05-16 PROCEDURE — 77063 BREAST TOMOSYNTHESIS BI: CPT | Performed by: RADIOLOGY

## 2024-05-16 NOTE — TELEPHONE ENCOUNTER
RIGHT SI JOINT INJ    NO AUTH REQUIRED    OK to schedule procedure approved as above.   Please note sides/levels approved and date range.   (If applicable, sides/levels approved may differ from those ordered)    TO BE SCHEDULED WITH DR WILLIAM @ [email or web address]

## 2024-05-17 DIAGNOSIS — R92.8 ABNORMALITY OF LEFT BREAST ON SCREENING MAMMOGRAM: ICD-10-CM

## 2024-05-20 ENCOUNTER — TELEPHONE (OUTPATIENT)
Dept: PAIN MANAGEMENT | Age: 69
End: 2024-05-20

## 2024-05-20 DIAGNOSIS — M46.1 SACROILIITIS (HCC): ICD-10-CM

## 2024-05-20 DIAGNOSIS — M47.817 LUMBOSACRAL SPONDYLOSIS WITHOUT MYELOPATHY: ICD-10-CM

## 2024-05-20 PROCEDURE — 72110 X-RAY EXAM L-2 SPINE 4/>VWS: CPT | Performed by: NURSE PRACTITIONER

## 2024-05-20 NOTE — TELEPHONE ENCOUNTER
----- Message from RAMON Salcedo - CNP sent at 5/20/2024 12:02 PM EDT -----  X-ray port of low back dated 5/16/2024 reviewed showing no fracture.  Moderate multilevel facet hypertrophy most pronounced at L4-5 and L5-S1.  SI joint sclerosis also noted.    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 Will discuss in detail at f/u

## 2024-06-04 ENCOUNTER — HOSPITAL ENCOUNTER (OUTPATIENT)
Age: 69
Discharge: HOME | End: 2024-06-04
Payer: MEDICARE

## 2024-06-04 VITALS
TEMPERATURE: 96.8 F | OXYGEN SATURATION: 95 % | BODY MASS INDEX: 39.27 KG/M2 | DIASTOLIC BLOOD PRESSURE: 95 MMHG | WEIGHT: 200 LBS | HEIGHT: 60 IN | SYSTOLIC BLOOD PRESSURE: 174 MMHG | HEART RATE: 63 BPM

## 2024-06-04 DIAGNOSIS — G89.4 CHRONIC PAIN SYNDROME: ICD-10-CM

## 2024-06-04 DIAGNOSIS — M46.1 SACROILIITIS, NOT ELSEWHERE CLASSIFIED (HCC): Primary | ICD-10-CM

## 2024-06-04 PROCEDURE — 6360000002 HC RX W HCPCS: Performed by: PAIN MEDICINE

## 2024-06-04 PROCEDURE — 2500000003 HC RX 250 WO HCPCS: Performed by: PAIN MEDICINE

## 2024-06-04 PROCEDURE — 77003 FLUOROGUIDE FOR SPINE INJECT: CPT

## 2024-06-04 RX ORDER — LIDOCAINE HYDROCHLORIDE 10 MG/ML
10 INJECTION, SOLUTION EPIDURAL; INFILTRATION; INTRACAUDAL; PERINEURAL ONCE
Status: COMPLETED | OUTPATIENT
Start: 2024-06-04 | End: 2024-06-04

## 2024-06-04 RX ORDER — BETAMETHASONE SODIUM PHOSPHATE AND BETAMETHASONE ACETATE 3; 3 MG/ML; MG/ML
6 INJECTION, SUSPENSION INTRA-ARTICULAR; INTRALESIONAL; INTRAMUSCULAR; SOFT TISSUE ONCE
Status: COMPLETED | OUTPATIENT
Start: 2024-06-04 | End: 2024-06-04

## 2024-06-04 RX ADMIN — LIDOCAINE HYDROCHLORIDE 10 MG: 10 INJECTION, SOLUTION EPIDURAL; INFILTRATION; INTRACAUDAL; PERINEURAL at 11:40

## 2024-06-04 RX ADMIN — BETAMETHASONE SODIUM PHOSPHATE AND BETAMETHASONE ACETATE 6 MG: 3; 3 INJECTION, SUSPENSION INTRA-ARTICULAR; INTRALESIONAL; INTRAMUSCULAR at 11:40

## 2024-06-04 NOTE — PROGRESS NOTES
[facility]  [address], [address]  [place], [place]  P: [phone]  F: [phone]      Patient Name: Viki Jerome  : 1955     Date:  2024      Physician: LOGAN WILLIAM DO        Viki Jerome is here today for interventional pain management.    Preoperatively, the patient presents with SI joint mediated pain, as per history and exam. Patient has failed NSAIDs, PT, and conservative treatment. Patient has significant psychological and functional impairment due to this condition.  Standard ASIPP guidelines were followed and sterile technique used.  Area was cleaned with Betadine x3.  Informed consent was obtained.  Fluoroscopic guidance was used for this procedure.    S.I. JOINT:  Right  Appropriate length spinal needle was advanced to the S.I. Joint.  Negative aspiration was achieved. In total, approximately 6mg of Betamethasone and 2ml of 1% preservative free Lidocaine was injected without difficulty.    Patient tolerated the procedure well, no obvious complications occurred during the procedure.  Patient was appropriately monitored and discharged home in stable condition with their usual motor strength. Post Op Instructions were given to patient. Relevant and recent imaging reviewed with patient today.                                      LOGAN WILLIAM DO                                             Statement Selected

## 2024-06-10 ENCOUNTER — HOSPITAL ENCOUNTER (OUTPATIENT)
Dept: RADIOLOGY | Facility: HOSPITAL | Age: 69
Discharge: HOME | End: 2024-06-10
Payer: MEDICARE

## 2024-06-10 VITALS — HEIGHT: 60 IN | WEIGHT: 200 LBS | BODY MASS INDEX: 39.27 KG/M2

## 2024-06-10 DIAGNOSIS — M47.16 LUMBAR SPONDYLOSIS WITH MYELOPATHY: ICD-10-CM

## 2024-06-10 DIAGNOSIS — R92.8 ABNORMALITY OF LEFT BREAST ON SCREENING MAMMOGRAM: ICD-10-CM

## 2024-06-10 PROCEDURE — 76642 ULTRASOUND BREAST LIMITED: CPT | Mod: LT

## 2024-06-10 PROCEDURE — 77065 DX MAMMO INCL CAD UNI: CPT | Mod: LEFT SIDE | Performed by: RADIOLOGY

## 2024-06-10 PROCEDURE — 76642 ULTRASOUND BREAST LIMITED: CPT | Mod: LEFT SIDE | Performed by: RADIOLOGY

## 2024-06-10 PROCEDURE — 77065 DX MAMMO INCL CAD UNI: CPT | Mod: LT

## 2024-06-10 PROCEDURE — G0279 TOMOSYNTHESIS, MAMMO: HCPCS | Mod: LEFT SIDE | Performed by: RADIOLOGY

## 2024-06-10 PROCEDURE — 77061 BREAST TOMOSYNTHESIS UNI: CPT | Mod: LT

## 2024-06-10 RX ORDER — TIZANIDINE 4 MG/1
4 TABLET ORAL NIGHTLY PRN
Qty: 30 TABLET | Refills: 0 | OUTPATIENT
Start: 2024-06-10

## 2024-06-10 NOTE — TELEPHONE ENCOUNTER
Rx Refill Request Telephone Encounter    Name:  Ragini Santos  :  [id number]  Medication Name:  tizanidine 4mg   Specific Pharmacy location:  [facility] in [place]   Date of last appointment:  24  Date of next appointment:  24

## 2024-06-17 ENCOUNTER — OFFICE VISIT (OUTPATIENT)
Dept: PAIN MANAGEMENT | Age: 69
End: 2024-06-17
Payer: MEDICARE

## 2024-06-17 VITALS
WEIGHT: 200 LBS | DIASTOLIC BLOOD PRESSURE: 82 MMHG | TEMPERATURE: 98.2 F | SYSTOLIC BLOOD PRESSURE: 134 MMHG | BODY MASS INDEX: 39.27 KG/M2 | HEIGHT: 60 IN

## 2024-06-17 DIAGNOSIS — M46.1 SACROILIITIS (HCC): ICD-10-CM

## 2024-06-17 DIAGNOSIS — M47.817 LUMBOSACRAL SPONDYLOSIS WITHOUT MYELOPATHY: ICD-10-CM

## 2024-06-17 PROCEDURE — G8417 CALC BMI ABV UP PARAM F/U: HCPCS | Performed by: NURSE PRACTITIONER

## 2024-06-17 PROCEDURE — 1123F ACP DISCUSS/DSCN MKR DOCD: CPT | Performed by: NURSE PRACTITIONER

## 2024-06-17 PROCEDURE — 99214 OFFICE O/P EST MOD 30 MIN: CPT | Performed by: NURSE PRACTITIONER

## 2024-06-17 PROCEDURE — G8427 DOCREV CUR MEDS BY ELIG CLIN: HCPCS | Performed by: NURSE PRACTITIONER

## 2024-06-17 PROCEDURE — 1090F PRES/ABSN URINE INCON ASSESS: CPT | Performed by: NURSE PRACTITIONER

## 2024-06-17 PROCEDURE — 1036F TOBACCO NON-USER: CPT | Performed by: NURSE PRACTITIONER

## 2024-06-17 PROCEDURE — 3075F SYST BP GE 130 - 139MM HG: CPT | Performed by: NURSE PRACTITIONER

## 2024-06-17 PROCEDURE — G8399 PT W/DXA RESULTS DOCUMENT: HCPCS | Performed by: NURSE PRACTITIONER

## 2024-06-17 PROCEDURE — 3079F DIAST BP 80-89 MM HG: CPT | Performed by: NURSE PRACTITIONER

## 2024-06-17 PROCEDURE — 3017F COLORECTAL CA SCREEN DOC REV: CPT | Performed by: NURSE PRACTITIONER

## 2024-06-17 RX ORDER — TRAMADOL HYDROCHLORIDE 50 MG/1
50 TABLET ORAL 2 TIMES DAILY PRN
Qty: 30 TABLET | Refills: 0 | Status: SHIPPED | OUTPATIENT
Start: 2024-06-17 | End: 2024-07-02

## 2024-06-17 ASSESSMENT — ENCOUNTER SYMPTOMS
EYES NEGATIVE: 1
CONSTIPATION: 0
COUGH: 0
TROUBLE SWALLOWING: 0
BACK PAIN: 1
GASTROINTESTINAL NEGATIVE: 1
SHORTNESS OF BREATH: 0
DIARRHEA: 0

## 2024-06-17 NOTE — PROGRESS NOTES
Viki Jerome  (1955)    2024    Subjective:     Vkii Jerome is 69 y.o. female who complains today of:    No chief complaint on file.        Allergies:  Patient has no known allergies.    Past Medical History:   Diagnosis Date    Anxiety     Cancer (HCC)     skin cancer    Depression     Diverticulitis     Hyperlipidemia     Hypertension     Obesity     Precordial pain 10/9/2018    Urinary incontinence      Past Surgical History:   Procedure Laterality Date    COLONOSCOPY      HYSTERECTOMY (CERVIX STATUS UNKNOWN)      SKIN CANCER EXCISION      forehead    TUBAL LIGATION  1976    UPPER GASTROINTESTINAL ENDOSCOPY  2016    w/bx      Family History   Problem Relation Age of Onset    Stroke Mother     COPD Mother     High Blood Pressure Father     Heart Disease Father     High Blood Pressure Sister     Kidney Disease Sister     High Blood Pressure Brother     High Blood Pressure Sister     High Blood Pressure Sister      Social History     Socioeconomic History    Marital status:      Spouse name: Not on file    Number of children: Not on file    Years of education: Not on file    Highest education level: Not on file   Occupational History    Not on file   Tobacco Use    Smoking status: Former     Current packs/day: 0.00     Types: Cigarettes     Start date: 1971     Quit date: 10/25/1971     Years since quittin.6    Smokeless tobacco: Never    Tobacco comments:     extremely light smoker only for a short time   Substance and Sexual Activity    Alcohol use: No    Drug use: No    Sexual activity: Not on file   Other Topics Concern    Not on file   Social History Narrative    Not on file     Social Determinants of Health     Financial Resource Strain: Not on file   Food Insecurity: Not on file   Transportation Needs: Not on file   Physical Activity: Not on file   Stress: Not on file   Social Connections: Not on file   Intimate Partner Violence: Not

## 2024-09-04 ENCOUNTER — APPOINTMENT (OUTPATIENT)
Dept: PRIMARY CARE | Facility: CLINIC | Age: 69
End: 2024-09-04
Payer: MEDICARE